# Patient Record
Sex: FEMALE | Race: OTHER | NOT HISPANIC OR LATINO | URBAN - METROPOLITAN AREA
[De-identification: names, ages, dates, MRNs, and addresses within clinical notes are randomized per-mention and may not be internally consistent; named-entity substitution may affect disease eponyms.]

---

## 2017-08-15 ENCOUNTER — OUTPATIENT (OUTPATIENT)
Dept: OUTPATIENT SERVICES | Facility: HOSPITAL | Age: 77
LOS: 1 days | Discharge: HOME | End: 2017-08-15

## 2017-08-15 DIAGNOSIS — D64.9 ANEMIA, UNSPECIFIED: ICD-10-CM

## 2017-08-15 DIAGNOSIS — M62.81 MUSCLE WEAKNESS (GENERALIZED): ICD-10-CM

## 2017-08-15 DIAGNOSIS — E11.9 TYPE 2 DIABETES MELLITUS WITHOUT COMPLICATIONS: ICD-10-CM

## 2017-08-15 DIAGNOSIS — I10 ESSENTIAL (PRIMARY) HYPERTENSION: ICD-10-CM

## 2017-08-15 DIAGNOSIS — I25.10 ATHEROSCLEROTIC HEART DISEASE OF NATIVE CORONARY ARTERY WITHOUT ANGINA PECTORIS: ICD-10-CM

## 2017-08-15 DIAGNOSIS — R53.1 WEAKNESS: ICD-10-CM

## 2017-08-15 DIAGNOSIS — E78.5 HYPERLIPIDEMIA, UNSPECIFIED: ICD-10-CM

## 2017-09-20 ENCOUNTER — OUTPATIENT (OUTPATIENT)
Dept: OUTPATIENT SERVICES | Facility: HOSPITAL | Age: 77
LOS: 1 days | Discharge: HOME | End: 2017-09-20

## 2017-09-20 DIAGNOSIS — E11.9 TYPE 2 DIABETES MELLITUS WITHOUT COMPLICATIONS: ICD-10-CM

## 2017-09-20 DIAGNOSIS — M62.81 MUSCLE WEAKNESS (GENERALIZED): ICD-10-CM

## 2017-09-20 DIAGNOSIS — I10 ESSENTIAL (PRIMARY) HYPERTENSION: ICD-10-CM

## 2017-09-20 DIAGNOSIS — R53.1 WEAKNESS: ICD-10-CM

## 2017-09-20 DIAGNOSIS — D64.9 ANEMIA, UNSPECIFIED: ICD-10-CM

## 2017-09-20 DIAGNOSIS — E78.5 HYPERLIPIDEMIA, UNSPECIFIED: ICD-10-CM

## 2017-09-22 ENCOUNTER — INPATIENT (INPATIENT)
Facility: HOSPITAL | Age: 77
LOS: 6 days | Discharge: NURSING, REHAB OR LONG-TERM CARE | End: 2017-09-29
Attending: INTERNAL MEDICINE | Admitting: INTERNAL MEDICINE

## 2017-09-22 DIAGNOSIS — M62.81 MUSCLE WEAKNESS (GENERALIZED): ICD-10-CM

## 2017-09-22 DIAGNOSIS — E11.9 TYPE 2 DIABETES MELLITUS WITHOUT COMPLICATIONS: ICD-10-CM

## 2017-09-22 DIAGNOSIS — D64.9 ANEMIA, UNSPECIFIED: ICD-10-CM

## 2017-09-22 DIAGNOSIS — R53.1 WEAKNESS: ICD-10-CM

## 2017-09-22 DIAGNOSIS — I10 ESSENTIAL (PRIMARY) HYPERTENSION: ICD-10-CM

## 2017-09-22 DIAGNOSIS — E78.5 HYPERLIPIDEMIA, UNSPECIFIED: ICD-10-CM

## 2017-10-02 DIAGNOSIS — E83.52 HYPERCALCEMIA: ICD-10-CM

## 2017-10-02 DIAGNOSIS — R05 COUGH: ICD-10-CM

## 2017-10-02 DIAGNOSIS — E11.9 TYPE 2 DIABETES MELLITUS WITHOUT COMPLICATIONS: ICD-10-CM

## 2017-10-02 DIAGNOSIS — R53.1 WEAKNESS: ICD-10-CM

## 2017-10-02 DIAGNOSIS — E87.5 HYPERKALEMIA: ICD-10-CM

## 2017-10-02 DIAGNOSIS — R26.81 UNSTEADINESS ON FEET: ICD-10-CM

## 2017-10-02 DIAGNOSIS — Z75.1 PERSON AWAITING ADMISSION TO ADEQUATE FACILITY ELSEWHERE: ICD-10-CM

## 2017-10-02 DIAGNOSIS — Z86.73 PERSONAL HISTORY OF TRANSIENT ISCHEMIC ATTACK (TIA), AND CEREBRAL INFARCTION WITHOUT RESIDUAL DEFICITS: ICD-10-CM

## 2017-10-02 DIAGNOSIS — I10 ESSENTIAL (PRIMARY) HYPERTENSION: ICD-10-CM

## 2017-10-02 DIAGNOSIS — D64.9 ANEMIA, UNSPECIFIED: ICD-10-CM

## 2017-10-02 DIAGNOSIS — E78.5 HYPERLIPIDEMIA, UNSPECIFIED: ICD-10-CM

## 2017-10-03 ENCOUNTER — OUTPATIENT (OUTPATIENT)
Dept: OUTPATIENT SERVICES | Facility: HOSPITAL | Age: 77
LOS: 1 days | Discharge: HOME | End: 2017-10-03

## 2017-10-03 DIAGNOSIS — E78.5 HYPERLIPIDEMIA, UNSPECIFIED: ICD-10-CM

## 2017-10-03 DIAGNOSIS — D64.9 ANEMIA, UNSPECIFIED: ICD-10-CM

## 2017-10-03 DIAGNOSIS — M62.81 MUSCLE WEAKNESS (GENERALIZED): ICD-10-CM

## 2017-10-03 DIAGNOSIS — R05 COUGH: ICD-10-CM

## 2017-10-03 DIAGNOSIS — I10 ESSENTIAL (PRIMARY) HYPERTENSION: ICD-10-CM

## 2017-10-03 DIAGNOSIS — E11.9 TYPE 2 DIABETES MELLITUS WITHOUT COMPLICATIONS: ICD-10-CM

## 2017-10-03 DIAGNOSIS — R53.1 WEAKNESS: ICD-10-CM

## 2017-10-04 ENCOUNTER — OUTPATIENT (OUTPATIENT)
Dept: OUTPATIENT SERVICES | Facility: HOSPITAL | Age: 77
LOS: 1 days | Discharge: HOME | End: 2017-10-04

## 2017-10-04 DIAGNOSIS — R79.9 ABNORMAL FINDING OF BLOOD CHEMISTRY, UNSPECIFIED: ICD-10-CM

## 2017-10-04 DIAGNOSIS — D64.9 ANEMIA, UNSPECIFIED: ICD-10-CM

## 2017-10-04 DIAGNOSIS — E78.5 HYPERLIPIDEMIA, UNSPECIFIED: ICD-10-CM

## 2017-10-04 DIAGNOSIS — K74.60 UNSPECIFIED CIRRHOSIS OF LIVER: ICD-10-CM

## 2017-10-04 DIAGNOSIS — R53.1 WEAKNESS: ICD-10-CM

## 2017-10-04 DIAGNOSIS — E11.9 TYPE 2 DIABETES MELLITUS WITHOUT COMPLICATIONS: ICD-10-CM

## 2017-10-04 DIAGNOSIS — M62.81 MUSCLE WEAKNESS (GENERALIZED): ICD-10-CM

## 2017-10-04 DIAGNOSIS — I10 ESSENTIAL (PRIMARY) HYPERTENSION: ICD-10-CM

## 2017-10-26 ENCOUNTER — OUTPATIENT (OUTPATIENT)
Dept: OUTPATIENT SERVICES | Facility: HOSPITAL | Age: 77
LOS: 1 days | Discharge: HOME | End: 2017-10-26

## 2017-10-26 DIAGNOSIS — I10 ESSENTIAL (PRIMARY) HYPERTENSION: ICD-10-CM

## 2017-10-26 DIAGNOSIS — E78.5 HYPERLIPIDEMIA, UNSPECIFIED: ICD-10-CM

## 2017-10-26 DIAGNOSIS — M62.81 MUSCLE WEAKNESS (GENERALIZED): ICD-10-CM

## 2017-10-26 DIAGNOSIS — R53.1 WEAKNESS: ICD-10-CM

## 2017-10-26 DIAGNOSIS — D64.9 ANEMIA, UNSPECIFIED: ICD-10-CM

## 2017-10-26 DIAGNOSIS — E11.9 TYPE 2 DIABETES MELLITUS WITHOUT COMPLICATIONS: ICD-10-CM

## 2018-02-13 ENCOUNTER — OUTPATIENT (OUTPATIENT)
Dept: OUTPATIENT SERVICES | Facility: HOSPITAL | Age: 78
LOS: 1 days | Discharge: HOME | End: 2018-02-13

## 2018-02-13 DIAGNOSIS — E11.9 TYPE 2 DIABETES MELLITUS WITHOUT COMPLICATIONS: ICD-10-CM

## 2018-02-13 DIAGNOSIS — D51.1 VITAMIN B12 DEFICIENCY ANEMIA DUE TO SELECTIVE VITAMIN B12 MALABSORPTION WITH PROTEINURIA: ICD-10-CM

## 2018-02-13 DIAGNOSIS — D50.9 IRON DEFICIENCY ANEMIA, UNSPECIFIED: ICD-10-CM

## 2018-02-13 DIAGNOSIS — I25.10 ATHEROSCLEROTIC HEART DISEASE OF NATIVE CORONARY ARTERY WITHOUT ANGINA PECTORIS: ICD-10-CM

## 2018-02-13 DIAGNOSIS — D51.8 OTHER VITAMIN B12 DEFICIENCY ANEMIAS: ICD-10-CM

## 2018-02-13 DIAGNOSIS — E03.9 HYPOTHYROIDISM, UNSPECIFIED: ICD-10-CM

## 2018-02-13 DIAGNOSIS — N39.0 URINARY TRACT INFECTION, SITE NOT SPECIFIED: ICD-10-CM

## 2018-04-20 ENCOUNTER — EMERGENCY (EMERGENCY)
Facility: HOSPITAL | Age: 78
LOS: 0 days | Discharge: HOME | End: 2018-04-20
Attending: EMERGENCY MEDICINE | Admitting: EMERGENCY MEDICINE

## 2018-04-20 VITALS
TEMPERATURE: 98 F | OXYGEN SATURATION: 97 % | DIASTOLIC BLOOD PRESSURE: 74 MMHG | RESPIRATION RATE: 16 BRPM | SYSTOLIC BLOOD PRESSURE: 133 MMHG | HEART RATE: 70 BPM

## 2018-04-20 VITALS
HEART RATE: 84 BPM | RESPIRATION RATE: 18 BRPM | TEMPERATURE: 98 F | DIASTOLIC BLOOD PRESSURE: 77 MMHG | OXYGEN SATURATION: 97 % | SYSTOLIC BLOOD PRESSURE: 172 MMHG

## 2018-04-20 DIAGNOSIS — R53.1 WEAKNESS: ICD-10-CM

## 2018-04-20 DIAGNOSIS — M54.2 CERVICALGIA: ICD-10-CM

## 2018-04-20 DIAGNOSIS — I10 ESSENTIAL (PRIMARY) HYPERTENSION: ICD-10-CM

## 2018-04-20 DIAGNOSIS — E11.9 TYPE 2 DIABETES MELLITUS WITHOUT COMPLICATIONS: ICD-10-CM

## 2018-04-20 LAB
ALBUMIN SERPL ELPH-MCNC: 4.2 G/DL — SIGNIFICANT CHANGE UP (ref 3.5–5.2)
ALP SERPL-CCNC: 89 U/L — SIGNIFICANT CHANGE UP (ref 30–115)
ALT FLD-CCNC: 8 U/L — SIGNIFICANT CHANGE UP (ref 0–41)
ANION GAP SERPL CALC-SCNC: 13 MMOL/L — SIGNIFICANT CHANGE UP (ref 7–14)
APTT BLD: 28.3 SEC — SIGNIFICANT CHANGE UP (ref 27–39.2)
AST SERPL-CCNC: 12 U/L — SIGNIFICANT CHANGE UP (ref 0–41)
BASE EXCESS BLDV CALC-SCNC: 4.2 MMOL/L — HIGH (ref -2–2)
BASOPHILS # BLD AUTO: 0.08 K/UL — SIGNIFICANT CHANGE UP (ref 0–0.2)
BASOPHILS NFR BLD AUTO: 1.3 % — HIGH (ref 0–1)
BILIRUB SERPL-MCNC: <0.2 MG/DL — SIGNIFICANT CHANGE UP (ref 0.2–1.2)
BUN SERPL-MCNC: 12 MG/DL — SIGNIFICANT CHANGE UP (ref 10–20)
CA-I SERPL-SCNC: 1.29 MMOL/L — SIGNIFICANT CHANGE UP (ref 1.12–1.3)
CALCIUM SERPL-MCNC: 10 MG/DL — SIGNIFICANT CHANGE UP (ref 8.5–10.1)
CHLORIDE SERPL-SCNC: 99 MMOL/L — SIGNIFICANT CHANGE UP (ref 98–110)
CK MB CFR SERPL CALC: 1.5 NG/ML — SIGNIFICANT CHANGE UP (ref 0.6–6.3)
CK SERPL-CCNC: 70 U/L — SIGNIFICANT CHANGE UP (ref 0–225)
CO2 SERPL-SCNC: 30 MMOL/L — SIGNIFICANT CHANGE UP (ref 17–32)
CREAT SERPL-MCNC: 1 MG/DL — SIGNIFICANT CHANGE UP (ref 0.7–1.5)
EOSINOPHIL # BLD AUTO: 0.4 K/UL — SIGNIFICANT CHANGE UP (ref 0–0.7)
EOSINOPHIL NFR BLD AUTO: 6.5 % — SIGNIFICANT CHANGE UP (ref 0–8)
GAS PNL BLDV: 142 MMOL/L — SIGNIFICANT CHANGE UP (ref 136–145)
GAS PNL BLDV: SIGNIFICANT CHANGE UP
GLUCOSE SERPL-MCNC: 137 MG/DL — HIGH (ref 70–99)
HCO3 BLDV-SCNC: 30 MMOL/L — HIGH (ref 22–29)
HCT VFR BLD CALC: 36.9 % — LOW (ref 37–47)
HCT VFR BLDA CALC: 35.6 % — SIGNIFICANT CHANGE UP (ref 34–44)
HGB BLD CALC-MCNC: 11.6 G/DL — LOW (ref 14–18)
HGB BLD-MCNC: 11.1 G/DL — LOW (ref 12–16)
IMM GRANULOCYTES NFR BLD AUTO: 0.3 % — SIGNIFICANT CHANGE UP (ref 0.1–0.3)
INR BLD: 1.04 RATIO — SIGNIFICANT CHANGE UP (ref 0.65–1.3)
LACTATE BLDV-MCNC: 2.8 MMOL/L — HIGH (ref 0.5–1.6)
LYMPHOCYTES # BLD AUTO: 2.26 K/UL — SIGNIFICANT CHANGE UP (ref 1.2–3.4)
LYMPHOCYTES # BLD AUTO: 36.8 % — SIGNIFICANT CHANGE UP (ref 20.5–51.1)
MCHC RBC-ENTMCNC: 21.9 PG — LOW (ref 27–31)
MCHC RBC-ENTMCNC: 30.1 G/DL — LOW (ref 32–37)
MCV RBC AUTO: 72.6 FL — LOW (ref 81–99)
MONOCYTES # BLD AUTO: 0.68 K/UL — HIGH (ref 0.1–0.6)
MONOCYTES NFR BLD AUTO: 11.1 % — HIGH (ref 1.7–9.3)
NEUTROPHILS # BLD AUTO: 2.7 K/UL — SIGNIFICANT CHANGE UP (ref 1.4–6.5)
NEUTROPHILS NFR BLD AUTO: 44 % — SIGNIFICANT CHANGE UP (ref 42.2–75.2)
NRBC # BLD: 0 /100 WBCS — SIGNIFICANT CHANGE UP (ref 0–0)
NT-PROBNP SERPL-SCNC: 196 PG/ML — SIGNIFICANT CHANGE UP (ref 0–300)
PCO2 BLDV: 46 MMHG — SIGNIFICANT CHANGE UP (ref 41–51)
PH BLDV: 7.41 — SIGNIFICANT CHANGE UP (ref 7.26–7.43)
PLATELET # BLD AUTO: 251 K/UL — SIGNIFICANT CHANGE UP (ref 130–400)
PO2 BLDV: 37 MMHG — SIGNIFICANT CHANGE UP (ref 20–40)
POTASSIUM BLDV-SCNC: 4.1 MMOL/L — SIGNIFICANT CHANGE UP (ref 3.3–5.6)
POTASSIUM SERPL-MCNC: 4.5 MMOL/L — SIGNIFICANT CHANGE UP (ref 3.5–5)
POTASSIUM SERPL-SCNC: 4.5 MMOL/L — SIGNIFICANT CHANGE UP (ref 3.5–5)
PROT SERPL-MCNC: 7.7 G/DL — SIGNIFICANT CHANGE UP (ref 6–8)
PROTHROM AB SERPL-ACNC: 11.2 SEC — SIGNIFICANT CHANGE UP (ref 9.95–12.87)
RBC # BLD: 5.08 M/UL — SIGNIFICANT CHANGE UP (ref 4.2–5.4)
RBC # FLD: 16.4 % — HIGH (ref 11.5–14.5)
SAO2 % BLDV: 70 % — SIGNIFICANT CHANGE UP
SODIUM SERPL-SCNC: 142 MMOL/L — SIGNIFICANT CHANGE UP (ref 135–146)
TROPONIN T SERPL-MCNC: <0.01 NG/ML — SIGNIFICANT CHANGE UP
WBC # BLD: 6.14 K/UL — SIGNIFICANT CHANGE UP (ref 4.8–10.8)
WBC # FLD AUTO: 6.14 K/UL — SIGNIFICANT CHANGE UP (ref 4.8–10.8)

## 2018-04-20 RX ORDER — ACETAMINOPHEN 500 MG
650 TABLET ORAL ONCE
Qty: 0 | Refills: 0 | Status: DISCONTINUED | OUTPATIENT
Start: 2018-04-20 | End: 2018-04-20

## 2018-04-20 NOTE — ED PROVIDER NOTE - NS ED ROS FT
ROS: No fever/chills, No headache/photophobia/eye pain/changes in vision, No ear pain/sore throat/dysphagia, No chest pain/palpitations, no SOB/cough/wheeze/stridor, No abdominal pain, No N/V/D/melena, no dysuria/frequency/discharge, no rash, no changes in neurological status/function.    +gneralized weakness, +r. neck pain

## 2018-04-20 NOTE — ED ADULT NURSE NOTE - PMH
Essential hypertension    High cholesterol    Type 2 diabetes mellitus without complication, without long-term current use of insulin

## 2018-04-20 NOTE — ED PROVIDER NOTE - OBJECTIVE STATEMENT
76y/o F w/hx of DM, HTN, and high cholesterol sent over by Dr. Molina who did an EKG and sent pt in for further evaluation. Pt went to Dr. ugarte for generalized weakness and r. shoulder, arm pain for  2 days. NO fever/chills recent travel hx, rash, headache/photophobia/eye pain/changes in vision, chest pain, SOB, vomiting or diarrhea

## 2018-04-20 NOTE — ED PROVIDER NOTE - PHYSICAL EXAMINATION
VITAL SIGNS: I have reviewed nursing notes and confirm.  CONSTITUTIONAL: Well-developed; well-nourished; in no acute distress. pt comfortable with family members.  SKIN: skin exam is warm and dry, no acute rash.   HEAD: Normocephalic; atraumatic.  EYES:  PERRLA EOM intact; conjunctiva and sclera clear.  ENT: No nasal discharge; airway clear. moist oral mucosa; uvula at midline. no pharyngeal erythema,   NECK: Supple; non tender.  CARD: S1, S2 normal; no murmurs, gallops, or rubs. Regular rate and rhythm. posterior tibial and radial pulses 2+  RESP: No wheezes, rales or rhonchi. cta b/l. no use of accessory muscles. no retractions  ABD: Normal bowel sounds; soft; non-distended; non-tender; no rebound.   EXT: Normal ROM. No  cyanosis or edema.  BACK: No cva tenderness  LYMPH: No acute cervical adenopathy.  NEURO: Alert, oriented, grossly unremarkable.  CN 2-12 intact. normal gait. normal romberg's.  sensory grossly intact to face, upper and lower extremity.  5/5 strength to , extension and flexion at elbow, flexion at hip  PSYCH: Cooperative, appropriate.

## 2018-04-20 NOTE — ED PROVIDER NOTE - PROGRESS NOTE DETAILS
ATTENDING NOTE: 76 y/o female with an pmhx of DM, HTN, and high cholesterol, presents to the ED with family member who states pt was sent over by Dr. Molina who did an EKG and sent pt in for further evaluation. Pt c/o right shoulder, right arm pain, and generalized weakness x 2 days. No fever/chills, No headache/photophobia/eye pain/changes in vision, No ear pain/sore throat/dysphagia, No chest pain/palpitations, no SOB/cough/wheeze/stridor, No abdominal pain, No N/V/D/melena, no dysuria/frequency/discharge, No neck/back pain, no rash, no changes in neurological status/function. pt in NAD, AAOx3, head NC/AT, CN II-XII intact, lungs CTA B/L, CV S1S2 regular, abdomen soft/NT/ND/(+)BS, ext (-) edema. motor 5/5x4, sensation intact. Plan: Labs, EKG, CXR, and reassess. Discussed with Dr. Molina about patients results-- PCP okay with patient being discharged.  patient has also NOT complained to him about CP or SOB, EF Discussed with Dr. Molina about patients results-- PCP okay with patient being discharged.  patient has also NOT complained to him about CP or SOB, EF 69 as per Dr. Preston, but was concern that there were crackles to lungs.  Discussed results of ekg, chest xray and blood.  Dr. Preston wants to f/u with him. ATTENDING NOTE: 76 y/o female with an pmhx of DM, HTN, and high cholesterol, presents to the ED with family member who states pt was sent over by Dr. Molina who did an EKG and sent pt in for further evaluation. Pt c/o right shoulder, right arm pain, and generalized weakness x 2 days. No fever/chills, No headache/photophobia/eye pain/changes in vision, No ear pain/sore throat/dysphagia, No chest pain/palpitations, no SOB/cough/wheeze/stridor, No abdominal pain, No N/V/D/melena, no dysuria/frequency/discharge, No neck/back pain, no rash, no changes in neurological status/function. pt in NAD, AAOx3, head NC/AT, CN II-XII intact, (+) TTP over right trapezius associated with spasm, lungs CTA B/L, CV S1S2 regular, abdomen soft/NT/ND/(+)BS, ext (-) edema. Plan: Labs, EKG, CXR, and reassess. Discussed with Dr. Molina about patients results-- PCP okay with patient being discharged.  patient has also NOT complained to him about CP or SOB, EF 69 as per Dr. Preston, but was concern that there were crackles to lungs.  Discussed results of ekg, chest xray and blood.  Dr. Preston wants to f/u with him in the office.

## 2018-06-19 ENCOUNTER — OUTPATIENT (OUTPATIENT)
Dept: OUTPATIENT SERVICES | Facility: HOSPITAL | Age: 78
LOS: 1 days | Discharge: HOME | End: 2018-06-19

## 2018-06-19 DIAGNOSIS — D51.1 VITAMIN B12 DEFICIENCY ANEMIA DUE TO SELECTIVE VITAMIN B12 MALABSORPTION WITH PROTEINURIA: ICD-10-CM

## 2018-06-19 DIAGNOSIS — N39.0 URINARY TRACT INFECTION, SITE NOT SPECIFIED: ICD-10-CM

## 2018-06-19 DIAGNOSIS — I25.10 ATHEROSCLEROTIC HEART DISEASE OF NATIVE CORONARY ARTERY WITHOUT ANGINA PECTORIS: ICD-10-CM

## 2018-06-19 DIAGNOSIS — E55.9 VITAMIN D DEFICIENCY, UNSPECIFIED: ICD-10-CM

## 2018-06-19 DIAGNOSIS — E11.9 TYPE 2 DIABETES MELLITUS WITHOUT COMPLICATIONS: ICD-10-CM

## 2018-06-19 DIAGNOSIS — D50.9 IRON DEFICIENCY ANEMIA, UNSPECIFIED: ICD-10-CM

## 2018-06-19 DIAGNOSIS — D51.8 OTHER VITAMIN B12 DEFICIENCY ANEMIAS: ICD-10-CM

## 2018-06-19 DIAGNOSIS — E83.40 DISORDERS OF MAGNESIUM METABOLISM, UNSPECIFIED: ICD-10-CM

## 2018-09-17 ENCOUNTER — OUTPATIENT (OUTPATIENT)
Dept: OUTPATIENT SERVICES | Facility: HOSPITAL | Age: 78
LOS: 1 days | Discharge: HOME | End: 2018-09-17

## 2018-09-17 DIAGNOSIS — E11.9 TYPE 2 DIABETES MELLITUS WITHOUT COMPLICATIONS: ICD-10-CM

## 2018-09-17 DIAGNOSIS — D51.8 OTHER VITAMIN B12 DEFICIENCY ANEMIAS: ICD-10-CM

## 2018-09-17 DIAGNOSIS — D51.1 VITAMIN B12 DEFICIENCY ANEMIA DUE TO SELECTIVE VITAMIN B12 MALABSORPTION WITH PROTEINURIA: ICD-10-CM

## 2018-09-17 DIAGNOSIS — E55.9 VITAMIN D DEFICIENCY, UNSPECIFIED: ICD-10-CM

## 2018-09-17 DIAGNOSIS — N39.0 URINARY TRACT INFECTION, SITE NOT SPECIFIED: ICD-10-CM

## 2018-09-17 DIAGNOSIS — D50.9 IRON DEFICIENCY ANEMIA, UNSPECIFIED: ICD-10-CM

## 2018-09-17 DIAGNOSIS — I25.10 ATHEROSCLEROTIC HEART DISEASE OF NATIVE CORONARY ARTERY WITHOUT ANGINA PECTORIS: ICD-10-CM

## 2018-09-18 PROBLEM — E78.00 PURE HYPERCHOLESTEROLEMIA, UNSPECIFIED: Chronic | Status: ACTIVE | Noted: 2018-04-20

## 2018-09-18 PROBLEM — E11.9 TYPE 2 DIABETES MELLITUS WITHOUT COMPLICATIONS: Chronic | Status: ACTIVE | Noted: 2018-04-20

## 2018-09-18 PROBLEM — I10 ESSENTIAL (PRIMARY) HYPERTENSION: Chronic | Status: ACTIVE | Noted: 2018-04-20

## 2018-10-03 ENCOUNTER — EMERGENCY (EMERGENCY)
Facility: HOSPITAL | Age: 78
LOS: 0 days | Discharge: HOME | End: 2018-10-04
Attending: EMERGENCY MEDICINE | Admitting: EMERGENCY MEDICINE

## 2018-10-03 VITALS
RESPIRATION RATE: 20 BRPM | SYSTOLIC BLOOD PRESSURE: 144 MMHG | DIASTOLIC BLOOD PRESSURE: 76 MMHG | HEART RATE: 78 BPM | OXYGEN SATURATION: 100 % | TEMPERATURE: 98 F

## 2018-10-03 DIAGNOSIS — E78.5 HYPERLIPIDEMIA, UNSPECIFIED: ICD-10-CM

## 2018-10-03 DIAGNOSIS — Z79.899 OTHER LONG TERM (CURRENT) DRUG THERAPY: ICD-10-CM

## 2018-10-03 DIAGNOSIS — R42 DIZZINESS AND GIDDINESS: ICD-10-CM

## 2018-10-03 DIAGNOSIS — E11.9 TYPE 2 DIABETES MELLITUS WITHOUT COMPLICATIONS: ICD-10-CM

## 2018-10-03 DIAGNOSIS — Z79.82 LONG TERM (CURRENT) USE OF ASPIRIN: ICD-10-CM

## 2018-10-03 DIAGNOSIS — R25.1 TREMOR, UNSPECIFIED: ICD-10-CM

## 2018-10-03 DIAGNOSIS — I10 ESSENTIAL (PRIMARY) HYPERTENSION: ICD-10-CM

## 2018-10-03 DIAGNOSIS — R19.7 DIARRHEA, UNSPECIFIED: ICD-10-CM

## 2018-10-03 DIAGNOSIS — E78.00 PURE HYPERCHOLESTEROLEMIA, UNSPECIFIED: ICD-10-CM

## 2018-10-03 DIAGNOSIS — R11.2 NAUSEA WITH VOMITING, UNSPECIFIED: ICD-10-CM

## 2018-10-03 DIAGNOSIS — Z79.84 LONG TERM (CURRENT) USE OF ORAL HYPOGLYCEMIC DRUGS: ICD-10-CM

## 2018-10-03 DIAGNOSIS — F03.90 UNSPECIFIED DEMENTIA WITHOUT BEHAVIORAL DISTURBANCE: ICD-10-CM

## 2018-10-03 LAB
ALBUMIN SERPL ELPH-MCNC: 4.6 G/DL — SIGNIFICANT CHANGE UP (ref 3.5–5.2)
ALP SERPL-CCNC: 107 U/L — SIGNIFICANT CHANGE UP (ref 30–115)
ALT FLD-CCNC: 11 U/L — SIGNIFICANT CHANGE UP (ref 0–41)
ANION GAP SERPL CALC-SCNC: 14 MMOL/L — SIGNIFICANT CHANGE UP (ref 7–14)
APPEARANCE UR: CLEAR — SIGNIFICANT CHANGE UP
APTT BLD: 29.6 SEC — SIGNIFICANT CHANGE UP (ref 27–39.2)
AST SERPL-CCNC: 14 U/L — SIGNIFICANT CHANGE UP (ref 0–41)
BACTERIA # UR AUTO: NEGATIVE — SIGNIFICANT CHANGE UP
BASOPHILS # BLD AUTO: 0.05 K/UL — SIGNIFICANT CHANGE UP (ref 0–0.2)
BASOPHILS NFR BLD AUTO: 0.8 % — SIGNIFICANT CHANGE UP (ref 0–1)
BILIRUB SERPL-MCNC: <0.2 MG/DL — SIGNIFICANT CHANGE UP (ref 0.2–1.2)
BILIRUB UR-MCNC: NEGATIVE — SIGNIFICANT CHANGE UP
BUN SERPL-MCNC: 10 MG/DL — SIGNIFICANT CHANGE UP (ref 10–20)
CALCIUM SERPL-MCNC: 11.2 MG/DL — HIGH (ref 8.5–10.1)
CHLORIDE SERPL-SCNC: 97 MMOL/L — LOW (ref 98–110)
CO2 SERPL-SCNC: 27 MMOL/L — SIGNIFICANT CHANGE UP (ref 17–32)
COLOR SPEC: YELLOW — SIGNIFICANT CHANGE UP
CREAT SERPL-MCNC: 0.8 MG/DL — SIGNIFICANT CHANGE UP (ref 0.7–1.5)
DIFF PNL FLD: ABNORMAL
EOSINOPHIL # BLD AUTO: 0.06 K/UL — SIGNIFICANT CHANGE UP (ref 0–0.7)
EOSINOPHIL NFR BLD AUTO: 0.9 % — SIGNIFICANT CHANGE UP (ref 0–8)
GLUCOSE SERPL-MCNC: 246 MG/DL — HIGH (ref 70–99)
GLUCOSE UR QL: 100 MG/DL
HCT VFR BLD CALC: 37.7 % — SIGNIFICANT CHANGE UP (ref 37–47)
HGB BLD-MCNC: 11.3 G/DL — LOW (ref 12–16)
IMM GRANULOCYTES NFR BLD AUTO: 0.5 % — HIGH (ref 0.1–0.3)
INR BLD: 1.02 RATIO — SIGNIFICANT CHANGE UP (ref 0.65–1.3)
KETONES UR-MCNC: NEGATIVE — SIGNIFICANT CHANGE UP
LEUKOCYTE ESTERASE UR-ACNC: NEGATIVE — SIGNIFICANT CHANGE UP
LIDOCAIN IGE QN: 15 U/L — SIGNIFICANT CHANGE UP (ref 7–60)
LYMPHOCYTES # BLD AUTO: 2.5 K/UL — SIGNIFICANT CHANGE UP (ref 1.2–3.4)
LYMPHOCYTES # BLD AUTO: 38.8 % — SIGNIFICANT CHANGE UP (ref 20.5–51.1)
MCHC RBC-ENTMCNC: 21.3 PG — LOW (ref 27–31)
MCHC RBC-ENTMCNC: 30 G/DL — LOW (ref 32–37)
MCV RBC AUTO: 71 FL — LOW (ref 81–99)
MONOCYTES # BLD AUTO: 0.61 K/UL — HIGH (ref 0.1–0.6)
MONOCYTES NFR BLD AUTO: 9.5 % — HIGH (ref 1.7–9.3)
NEUTROPHILS # BLD AUTO: 3.19 K/UL — SIGNIFICANT CHANGE UP (ref 1.4–6.5)
NEUTROPHILS NFR BLD AUTO: 49.5 % — SIGNIFICANT CHANGE UP (ref 42.2–75.2)
NITRITE UR-MCNC: NEGATIVE — SIGNIFICANT CHANGE UP
NRBC # BLD: 0 /100 WBCS — SIGNIFICANT CHANGE UP (ref 0–0)
PH UR: 6 — SIGNIFICANT CHANGE UP (ref 5–8)
PLATELET # BLD AUTO: 269 K/UL — SIGNIFICANT CHANGE UP (ref 130–400)
POTASSIUM SERPL-MCNC: 4.3 MMOL/L — SIGNIFICANT CHANGE UP (ref 3.5–5)
POTASSIUM SERPL-SCNC: 4.3 MMOL/L — SIGNIFICANT CHANGE UP (ref 3.5–5)
PROT SERPL-MCNC: 8.4 G/DL — HIGH (ref 6–8)
PROT UR-MCNC: NEGATIVE — SIGNIFICANT CHANGE UP
PROTHROM AB SERPL-ACNC: 11 SEC — SIGNIFICANT CHANGE UP (ref 9.95–12.87)
RBC # BLD: 5.31 M/UL — SIGNIFICANT CHANGE UP (ref 4.2–5.4)
RBC # FLD: 15.8 % — HIGH (ref 11.5–14.5)
RBC CASTS # UR COMP ASSIST: SIGNIFICANT CHANGE UP /HPF
SODIUM SERPL-SCNC: 138 MMOL/L — SIGNIFICANT CHANGE UP (ref 135–146)
SP GR SPEC: 1.01 — SIGNIFICANT CHANGE UP (ref 1.01–1.03)
TROPONIN T SERPL-MCNC: <0.01 NG/ML — SIGNIFICANT CHANGE UP
UROBILINOGEN FLD QL: 0.2 — SIGNIFICANT CHANGE UP (ref 0.2–0.2)
WBC # BLD: 6.44 K/UL — SIGNIFICANT CHANGE UP (ref 4.8–10.8)
WBC # FLD AUTO: 6.44 K/UL — SIGNIFICANT CHANGE UP (ref 4.8–10.8)
WBC UR QL: SIGNIFICANT CHANGE UP /HPF

## 2018-10-03 RX ORDER — ONDANSETRON 8 MG/1
4 TABLET, FILM COATED ORAL ONCE
Qty: 0 | Refills: 0 | Status: COMPLETED | OUTPATIENT
Start: 2018-10-03 | End: 2018-10-03

## 2018-10-03 RX ORDER — SODIUM CHLORIDE 9 MG/ML
1000 INJECTION INTRAMUSCULAR; INTRAVENOUS; SUBCUTANEOUS ONCE
Qty: 0 | Refills: 0 | Status: COMPLETED | OUTPATIENT
Start: 2018-10-03 | End: 2018-10-03

## 2018-10-03 RX ORDER — FAMOTIDINE 10 MG/ML
20 INJECTION INTRAVENOUS ONCE
Qty: 0 | Refills: 0 | Status: COMPLETED | OUTPATIENT
Start: 2018-10-03 | End: 2018-10-03

## 2018-10-03 RX ADMIN — SODIUM CHLORIDE 1000 MILLILITER(S): 9 INJECTION INTRAMUSCULAR; INTRAVENOUS; SUBCUTANEOUS at 21:08

## 2018-10-03 RX ADMIN — ONDANSETRON 4 MILLIGRAM(S): 8 TABLET, FILM COATED ORAL at 21:08

## 2018-10-03 RX ADMIN — FAMOTIDINE 20 MILLIGRAM(S): 10 INJECTION INTRAVENOUS at 21:08

## 2018-10-03 RX ADMIN — SODIUM CHLORIDE 1000 MILLILITER(S): 9 INJECTION INTRAMUSCULAR; INTRAVENOUS; SUBCUTANEOUS at 22:08

## 2018-10-03 NOTE — ED PROVIDER NOTE - NS ED ROS FT
Constitutional: no fever, chills, no recent weight loss, change in appetite or malaise  Eyes: no redness/discharge/pain/vision changes  ENT: no rhinorrhea/ear pain/sore throat  Cardiac: No chest pain, SOB or edema.  Respiratory: No cough or respiratory distress  GI: + nausea, No vomiting, diarrhea or abdominal pain.  : No dysuria, frequency, urgency or hematuria  MS: no pain to back or extremities, no loss of ROM, no weakness  Neuro: No headache or weakness. No LOC.  Skin: No skin rash.  Endocrine: + diabetes.  Except as documented in the HPI, all other systems are negative.

## 2018-10-03 NOTE — ED PROVIDER NOTE - PMH
Essential hypertension    High cholesterol    Type 2 diabetes mellitus without complication, without long-term current use of insulin Dementia    Essential hypertension    High cholesterol    Type 2 diabetes mellitus without complication, without long-term current use of insulin

## 2018-10-03 NOTE — ED PROVIDER NOTE - ATTENDING CONTRIBUTION TO CARE
pt here for shakes/chills per aid.  pt is poor historian. per aid, had chills/shakes. no loc. no n, v,d. pt at her baseline ms. pt in nad, anicteric, neck sup, ctab, rrr, ab soft, nt, nd. no focal def. will get labs, urine, cxr.

## 2018-10-03 NOTE — ED PROVIDER NOTE - OBJECTIVE STATEMENT
78 yo female hx of HTN/HLD/DM/Dementia brought in by HHA 2/2 nausea and dizziness. denies vomiting and diarrhea. HHA noticed tremors so she called ambulance. patient denies any sxs in ED now. Denies HA/slur speech/extremities weakness. Denies Fever/chill/recent illness/coughing/chest pain/sob/abd pain/n/v/d/extremities pain/urinary sxs. Denies SI/HI/Hallucinations

## 2018-10-03 NOTE — ED ADULT NURSE NOTE - OBJECTIVE STATEMENT
78 y/o female presents to the ED c/o n/v/d & weakness x today. Denies hematemesis, CP, urinary symptoms, BRBPR, or syncope

## 2018-10-03 NOTE — ED PROVIDER NOTE - PHYSICAL EXAMINATION
CONSTITUTIONAL: Well-appearing; well-nourished; in no apparent distress.   EYES: PERRL; EOM intact.   ENT: normal nose; no rhinorrhea; normal pharynx with no tonsillar hypertrophy.   CARDIOVASCULAR: Normal S1, S2; no murmurs, rubs, or gallops.   RESPIRATORY: Normal chest excursion with respiration; breath sounds clear and equal bilaterally; no wheezes, rhonchi, or rales.  GI/: Normal bowel sounds; non-distended; non-tender; no palpable organomegaly.   MS: No evidence of trauma or deformity. Non-tender to palpation. Normal ROM in all four extremities; non-tender to palpation; distal pulses are normal.   SKIN: Normal for age and race; warm; dry; good turgor; no apparent lesions or exudate.   NEURO/PSYCH: A & O x 4; grossly unremarkable. mood and manner are appropriate.

## 2018-10-07 LAB
-  AMIKACIN: SIGNIFICANT CHANGE UP
-  AZTREONAM: SIGNIFICANT CHANGE UP
-  CEFEPIME: SIGNIFICANT CHANGE UP
-  CIPROFLOXACIN: SIGNIFICANT CHANGE UP
-  GENTAMICIN: SIGNIFICANT CHANGE UP
-  LEVOFLOXACIN: SIGNIFICANT CHANGE UP
-  MEROPENEM: SIGNIFICANT CHANGE UP
-  PIPERACILLIN/TAZOBACTAM: SIGNIFICANT CHANGE UP
-  TOBRAMYCIN: SIGNIFICANT CHANGE UP
CULTURE RESULTS: SIGNIFICANT CHANGE UP
METHOD TYPE: SIGNIFICANT CHANGE UP
ORGANISM # SPEC MICROSCOPIC CNT: SIGNIFICANT CHANGE UP
ORGANISM # SPEC MICROSCOPIC CNT: SIGNIFICANT CHANGE UP
SPECIMEN SOURCE: SIGNIFICANT CHANGE UP

## 2018-10-11 RX ORDER — MOXIFLOXACIN HYDROCHLORIDE TABLETS, 400 MG 400 MG/1
1 TABLET, FILM COATED ORAL
Qty: 14 | Refills: 0
Start: 2018-10-11 | End: 2018-10-17

## 2019-03-08 ENCOUNTER — EMERGENCY (EMERGENCY)
Facility: HOSPITAL | Age: 79
LOS: 0 days | Discharge: HOME | End: 2019-03-08
Admitting: PHYSICIAN ASSISTANT

## 2019-03-08 VITALS
RESPIRATION RATE: 19 BRPM | DIASTOLIC BLOOD PRESSURE: 78 MMHG | SYSTOLIC BLOOD PRESSURE: 168 MMHG | TEMPERATURE: 97 F | HEART RATE: 73 BPM | OXYGEN SATURATION: 100 %

## 2019-03-08 DIAGNOSIS — Z79.899 OTHER LONG TERM (CURRENT) DRUG THERAPY: ICD-10-CM

## 2019-03-08 DIAGNOSIS — Z79.2 LONG TERM (CURRENT) USE OF ANTIBIOTICS: ICD-10-CM

## 2019-03-08 DIAGNOSIS — K08.89 OTHER SPECIFIED DISORDERS OF TEETH AND SUPPORTING STRUCTURES: ICD-10-CM

## 2019-03-08 DIAGNOSIS — Z79.84 LONG TERM (CURRENT) USE OF ORAL HYPOGLYCEMIC DRUGS: ICD-10-CM

## 2019-03-08 DIAGNOSIS — Z79.82 LONG TERM (CURRENT) USE OF ASPIRIN: ICD-10-CM

## 2019-03-08 DIAGNOSIS — F03.90 UNSPECIFIED DEMENTIA WITHOUT BEHAVIORAL DISTURBANCE: ICD-10-CM

## 2019-03-08 DIAGNOSIS — K02.9 DENTAL CARIES, UNSPECIFIED: ICD-10-CM

## 2019-03-08 NOTE — ED PROVIDER NOTE - OBJECTIVE STATEMENT
dental pain x 2 weeks. Constant ache mod in severity. No relieving factors. Worse with chewing. No fever

## 2019-03-08 NOTE — PROGRESS NOTE ADULT - SUBJECTIVE AND OBJECTIVE BOX
Patient is a 78y old  Female who presents with a chief complaint of pain in the anterior region of the mandible.    PAST MEDICAL & SURGICAL HISTORY:  Dementia  Essential hypertension  Type 2 diabetes mellitus without complication, without long-term current use of insulin  High cholesterol  No significant past surgical history    Allergic/Immunologic:	    Allergies    No Known Allergies    Intolerances        FAMILY HISTORY:  No pertinent family history in first degree relatives      Vital Signs Last 24 Hrs  T(C): 36.1 (08 Mar 2019 13:49), Max: 36.1 (08 Mar 2019 13:49)  T(F): 96.9 (08 Mar 2019 13:49), Max: 96.9 (08 Mar 2019 13:49)  HR: 73 (08 Mar 2019 13:49) (73 - 73)  BP: 168/78 (08 Mar 2019 13:49) (168/78 - 168/78)  BP(mean): --  RR: 19 (08 Mar 2019 13:49) (19 - 19)  SpO2: 100% (08 Mar 2019 13:49) (100% - 100%)    IOE:  <<permanent>> dentition:                      <<multiple carious teeth>>               hard/soft palate:  <<No pathology noted>>            tongue/FOM <<No pathology noted>>            labial/buccal mucosa <<No pathology noted>>           ( Y) percussion           ( Y  ) palpation           ( N  ) swelling            ( N  ) abscess           ( N  ) sinus tract    *DENTAL RADIOGRAPHS: periapical 25/26    *ASSESSMENT: Patient is a 78y old  Female who presents with a chief complaint of pain in the anterior region of the mandible. Patient is afebrile and does not present with any swelling or trismus. Patient has grossly decayed teeth.    *PLAN: Extract #25/26    PROCEDURE:   Verbal and written consent given.  Anesthesia: <<1 carpule Septocaine HCl 4% 1:100,000 epinephrine as buccal/lingual infiltration.    >>   Treatment: <<Risks/benefits discussed as per OS sheet 7/13/00. Side/site/consent signed. 1 carpule Septocaine HCl 4% 1:100,000 epinephrine as buccal/lingual infiltration.  Simple extraction 25/26, post-op x-ray/instructions, hemostasis achieved.  >>     RECOMMENDATIONS:  1) <<amox 500 and ibuprofen 600 prescribed   >>  2) Dental F/U with outpatient dentist for comprehensive dental care.   3) If any difficulty swallowing/breathing, fever occur, return to ER.     Kaleb Prokopenko DDS

## 2019-03-08 NOTE — ED PROVIDER NOTE - CLINICAL SUMMARY MEDICAL DECISION MAKING FREE TEXT BOX
Pt with dementia c/o dental pain x 2 weeks. Exam with widespread decay and gingival disease but no abscess. No report of CP, fever. Will transfer to dental

## 2019-03-08 NOTE — ED PROVIDER NOTE - PHYSICAL EXAMINATION
CONST: Well appearing in NAD  EYES, EOMI, Sclera and conjunctiva clear.   ENT: No nasal discharge. TM's clear B/L without drainage. Oropharynx normal appearing, no erythema or exudates. Uvula midline. widespread dental decay and gingival disease. No abscess formation.   NECK: Non-tender, no meningeal signs  CARD: Normal S1 S2; Normal rate and rhythm  RESP: Equal BS B/L, No wheezes, rhonchi or rales. No distress  GI: Soft, non-tender, non-distended.  MS: Normal ROM in all extremities. No midline spinal tenderness.  SKIN: Warm, dry, no acute rashes. Good turgor  NEURO: A&Ox2, (dementia baseline) No focal deficits. Strength 5/5 with no sensory deficits. Steady gait

## 2019-04-05 ENCOUNTER — OUTPATIENT (OUTPATIENT)
Dept: OUTPATIENT SERVICES | Facility: HOSPITAL | Age: 79
LOS: 1 days | Discharge: HOME | End: 2019-04-05

## 2019-04-05 DIAGNOSIS — E78.5 HYPERLIPIDEMIA, UNSPECIFIED: ICD-10-CM

## 2019-04-05 DIAGNOSIS — Z00.00 ENCOUNTER FOR GENERAL ADULT MEDICAL EXAMINATION WITHOUT ABNORMAL FINDINGS: ICD-10-CM

## 2019-04-05 DIAGNOSIS — E11.9 TYPE 2 DIABETES MELLITUS WITHOUT COMPLICATIONS: ICD-10-CM

## 2019-04-05 DIAGNOSIS — D51.0 VITAMIN B12 DEFICIENCY ANEMIA DUE TO INTRINSIC FACTOR DEFICIENCY: ICD-10-CM

## 2019-04-05 DIAGNOSIS — E03.9 HYPOTHYROIDISM, UNSPECIFIED: ICD-10-CM

## 2019-04-05 DIAGNOSIS — D53.9 NUTRITIONAL ANEMIA, UNSPECIFIED: ICD-10-CM

## 2019-04-05 DIAGNOSIS — E55.9 VITAMIN D DEFICIENCY, UNSPECIFIED: ICD-10-CM

## 2019-04-05 DIAGNOSIS — N39.0 URINARY TRACT INFECTION, SITE NOT SPECIFIED: ICD-10-CM

## 2019-04-05 DIAGNOSIS — D51.8 OTHER VITAMIN B12 DEFICIENCY ANEMIAS: ICD-10-CM

## 2019-06-17 ENCOUNTER — INPATIENT (INPATIENT)
Facility: HOSPITAL | Age: 79
LOS: 0 days | Discharge: HOME HEALTH | End: 2019-06-18
Attending: HOSPITALIST | Admitting: HOSPITALIST
Payer: MEDICAID

## 2019-06-17 VITALS
SYSTOLIC BLOOD PRESSURE: 145 MMHG | RESPIRATION RATE: 18 BRPM | DIASTOLIC BLOOD PRESSURE: 76 MMHG | HEART RATE: 88 BPM | OXYGEN SATURATION: 100 % | TEMPERATURE: 99 F

## 2019-06-17 LAB
ALBUMIN SERPL ELPH-MCNC: 4.3 G/DL — SIGNIFICANT CHANGE UP (ref 3.5–5.2)
ALP SERPL-CCNC: 63 U/L — SIGNIFICANT CHANGE UP (ref 30–115)
ALT FLD-CCNC: 11 U/L — SIGNIFICANT CHANGE UP (ref 0–41)
ANION GAP SERPL CALC-SCNC: 15 MMOL/L — HIGH (ref 7–14)
APPEARANCE UR: CLEAR — SIGNIFICANT CHANGE UP
AST SERPL-CCNC: 14 U/L — SIGNIFICANT CHANGE UP (ref 0–41)
BASOPHILS # BLD AUTO: 0.06 K/UL — SIGNIFICANT CHANGE UP (ref 0–0.2)
BASOPHILS NFR BLD AUTO: 1 % — SIGNIFICANT CHANGE UP (ref 0–1)
BILIRUB SERPL-MCNC: 0.2 MG/DL — SIGNIFICANT CHANGE UP (ref 0.2–1.2)
BILIRUB UR-MCNC: NEGATIVE — SIGNIFICANT CHANGE UP
BUN SERPL-MCNC: 10 MG/DL — SIGNIFICANT CHANGE UP (ref 10–20)
CALCIUM SERPL-MCNC: 10 MG/DL — SIGNIFICANT CHANGE UP (ref 8.5–10.1)
CHLORIDE SERPL-SCNC: 84 MMOL/L — LOW (ref 98–110)
CK SERPL-CCNC: 79 U/L — SIGNIFICANT CHANGE UP (ref 0–225)
CO2 SERPL-SCNC: 26 MMOL/L — SIGNIFICANT CHANGE UP (ref 17–32)
COLOR SPEC: YELLOW — SIGNIFICANT CHANGE UP
CREAT SERPL-MCNC: 0.9 MG/DL — SIGNIFICANT CHANGE UP (ref 0.7–1.5)
DIFF PNL FLD: ABNORMAL
EOSINOPHIL # BLD AUTO: 0.05 K/UL — SIGNIFICANT CHANGE UP (ref 0–0.7)
EOSINOPHIL NFR BLD AUTO: 0.9 % — SIGNIFICANT CHANGE UP (ref 0–8)
EPI CELLS # UR: ABNORMAL /HPF
GLUCOSE BLDC GLUCOMTR-MCNC: 234 MG/DL — HIGH (ref 70–99)
GLUCOSE SERPL-MCNC: 218 MG/DL — HIGH (ref 70–99)
GLUCOSE UR QL: NEGATIVE MG/DL — SIGNIFICANT CHANGE UP
HCT VFR BLD CALC: 36.2 % — LOW (ref 37–47)
HGB BLD-MCNC: 11.5 G/DL — LOW (ref 12–16)
IMM GRANULOCYTES NFR BLD AUTO: 0.3 % — SIGNIFICANT CHANGE UP (ref 0.1–0.3)
KETONES UR-MCNC: NEGATIVE — SIGNIFICANT CHANGE UP
LEUKOCYTE ESTERASE UR-ACNC: NEGATIVE — SIGNIFICANT CHANGE UP
LYMPHOCYTES # BLD AUTO: 1.53 K/UL — SIGNIFICANT CHANGE UP (ref 1.2–3.4)
LYMPHOCYTES # BLD AUTO: 26.2 % — SIGNIFICANT CHANGE UP (ref 20.5–51.1)
MCHC RBC-ENTMCNC: 22.3 PG — LOW (ref 27–31)
MCHC RBC-ENTMCNC: 31.8 G/DL — LOW (ref 32–37)
MCV RBC AUTO: 70.2 FL — LOW (ref 81–99)
MONOCYTES # BLD AUTO: 0.61 K/UL — HIGH (ref 0.1–0.6)
MONOCYTES NFR BLD AUTO: 10.4 % — HIGH (ref 1.7–9.3)
NEUTROPHILS # BLD AUTO: 3.57 K/UL — SIGNIFICANT CHANGE UP (ref 1.4–6.5)
NEUTROPHILS NFR BLD AUTO: 61.2 % — SIGNIFICANT CHANGE UP (ref 42.2–75.2)
NITRITE UR-MCNC: NEGATIVE — SIGNIFICANT CHANGE UP
NRBC # BLD: 0 /100 WBCS — SIGNIFICANT CHANGE UP (ref 0–0)
PH UR: 7 — SIGNIFICANT CHANGE UP (ref 5–8)
PLATELET # BLD AUTO: 256 K/UL — SIGNIFICANT CHANGE UP (ref 130–400)
POTASSIUM SERPL-MCNC: 3.8 MMOL/L — SIGNIFICANT CHANGE UP (ref 3.5–5)
POTASSIUM SERPL-SCNC: 3.8 MMOL/L — SIGNIFICANT CHANGE UP (ref 3.5–5)
PROT SERPL-MCNC: 7.7 G/DL — SIGNIFICANT CHANGE UP (ref 6–8)
PROT UR-MCNC: NEGATIVE MG/DL — SIGNIFICANT CHANGE UP
RBC # BLD: 5.16 M/UL — SIGNIFICANT CHANGE UP (ref 4.2–5.4)
RBC # FLD: 15.7 % — HIGH (ref 11.5–14.5)
RBC CASTS # UR COMP ASSIST: SIGNIFICANT CHANGE UP /HPF
SODIUM SERPL-SCNC: 125 MMOL/L — LOW (ref 135–146)
SP GR SPEC: <=1.005 — SIGNIFICANT CHANGE UP (ref 1.01–1.03)
TROPONIN T SERPL-MCNC: <0.01 NG/ML — SIGNIFICANT CHANGE UP
UROBILINOGEN FLD QL: 0.2 MG/DL — SIGNIFICANT CHANGE UP (ref 0.2–0.2)
WBC # BLD: 5.84 K/UL — SIGNIFICANT CHANGE UP (ref 4.8–10.8)
WBC # FLD AUTO: 5.84 K/UL — SIGNIFICANT CHANGE UP (ref 4.8–10.8)

## 2019-06-17 PROCEDURE — 71045 X-RAY EXAM CHEST 1 VIEW: CPT | Mod: 26

## 2019-06-17 PROCEDURE — 70450 CT HEAD/BRAIN W/O DYE: CPT | Mod: 26

## 2019-06-17 PROCEDURE — 99285 EMERGENCY DEPT VISIT HI MDM: CPT

## 2019-06-17 PROCEDURE — 93010 ELECTROCARDIOGRAM REPORT: CPT

## 2019-06-17 RX ORDER — GABAPENTIN 400 MG/1
100 CAPSULE ORAL THREE TIMES A DAY
Refills: 0 | Status: DISCONTINUED | OUTPATIENT
Start: 2019-06-17 | End: 2019-06-18

## 2019-06-17 RX ORDER — ATORVASTATIN CALCIUM 80 MG/1
40 TABLET, FILM COATED ORAL AT BEDTIME
Refills: 0 | Status: DISCONTINUED | OUTPATIENT
Start: 2019-06-17 | End: 2019-06-18

## 2019-06-17 RX ORDER — SODIUM CHLORIDE 9 MG/ML
1000 INJECTION, SOLUTION INTRAVENOUS
Refills: 0 | Status: DISCONTINUED | OUTPATIENT
Start: 2019-06-17 | End: 2019-06-18

## 2019-06-17 RX ORDER — INSULIN GLARGINE 100 [IU]/ML
15 INJECTION, SOLUTION SUBCUTANEOUS AT BEDTIME
Refills: 0 | Status: DISCONTINUED | OUTPATIENT
Start: 2019-06-17 | End: 2019-06-18

## 2019-06-17 RX ORDER — LOSARTAN POTASSIUM 100 MG/1
50 TABLET, FILM COATED ORAL DAILY
Refills: 0 | Status: DISCONTINUED | OUTPATIENT
Start: 2019-06-17 | End: 2019-06-17

## 2019-06-17 RX ORDER — INSULIN LISPRO 100/ML
5 VIAL (ML) SUBCUTANEOUS
Refills: 0 | Status: DISCONTINUED | OUTPATIENT
Start: 2019-06-17 | End: 2019-06-18

## 2019-06-17 RX ORDER — LOSARTAN POTASSIUM 100 MG/1
1 TABLET, FILM COATED ORAL
Qty: 0 | Refills: 0 | DISCHARGE

## 2019-06-17 RX ORDER — ENOXAPARIN SODIUM 100 MG/ML
40 INJECTION SUBCUTANEOUS EVERY 24 HOURS
Refills: 0 | Status: DISCONTINUED | OUTPATIENT
Start: 2019-06-17 | End: 2019-06-18

## 2019-06-17 RX ORDER — LOSARTAN POTASSIUM 100 MG/1
100 TABLET, FILM COATED ORAL DAILY
Refills: 0 | Status: DISCONTINUED | OUTPATIENT
Start: 2019-06-17 | End: 2019-06-18

## 2019-06-17 RX ORDER — CHLORHEXIDINE GLUCONATE 213 G/1000ML
1 SOLUTION TOPICAL
Refills: 0 | Status: DISCONTINUED | OUTPATIENT
Start: 2019-06-17 | End: 2019-06-18

## 2019-06-17 RX ORDER — DEXTROSE 50 % IN WATER 50 %
12.5 SYRINGE (ML) INTRAVENOUS ONCE
Refills: 0 | Status: DISCONTINUED | OUTPATIENT
Start: 2019-06-17 | End: 2019-06-18

## 2019-06-17 RX ORDER — ASPIRIN/CALCIUM CARB/MAGNESIUM 324 MG
81 TABLET ORAL DAILY
Refills: 0 | Status: DISCONTINUED | OUTPATIENT
Start: 2019-06-17 | End: 2019-06-18

## 2019-06-17 RX ORDER — DEXTROSE 50 % IN WATER 50 %
15 SYRINGE (ML) INTRAVENOUS ONCE
Refills: 0 | Status: DISCONTINUED | OUTPATIENT
Start: 2019-06-17 | End: 2019-06-18

## 2019-06-17 RX ORDER — INSULIN LISPRO 100/ML
VIAL (ML) SUBCUTANEOUS
Refills: 0 | Status: DISCONTINUED | OUTPATIENT
Start: 2019-06-17 | End: 2019-06-18

## 2019-06-17 RX ORDER — GLUCAGON INJECTION, SOLUTION 0.5 MG/.1ML
1 INJECTION, SOLUTION SUBCUTANEOUS ONCE
Refills: 0 | Status: DISCONTINUED | OUTPATIENT
Start: 2019-06-17 | End: 2019-06-18

## 2019-06-17 RX ORDER — DEXTROSE 50 % IN WATER 50 %
25 SYRINGE (ML) INTRAVENOUS ONCE
Refills: 0 | Status: DISCONTINUED | OUTPATIENT
Start: 2019-06-17 | End: 2019-06-18

## 2019-06-17 RX ORDER — NIFEDIPINE 30 MG
60 TABLET, EXTENDED RELEASE 24 HR ORAL DAILY
Refills: 0 | Status: DISCONTINUED | OUTPATIENT
Start: 2019-06-17 | End: 2019-06-18

## 2019-06-17 RX ORDER — SODIUM CHLORIDE 9 MG/ML
1000 INJECTION INTRAMUSCULAR; INTRAVENOUS; SUBCUTANEOUS
Refills: 0 | Status: DISCONTINUED | OUTPATIENT
Start: 2019-06-17 | End: 2019-06-18

## 2019-06-17 RX ADMIN — GABAPENTIN 100 MILLIGRAM(S): 400 CAPSULE ORAL at 23:23

## 2019-06-17 RX ADMIN — INSULIN GLARGINE 15 UNIT(S): 100 INJECTION, SOLUTION SUBCUTANEOUS at 23:30

## 2019-06-17 RX ADMIN — ATORVASTATIN CALCIUM 40 MILLIGRAM(S): 80 TABLET, FILM COATED ORAL at 23:24

## 2019-06-17 RX ADMIN — SODIUM CHLORIDE 100 MILLILITER(S): 9 INJECTION INTRAMUSCULAR; INTRAVENOUS; SUBCUTANEOUS at 19:13

## 2019-06-17 NOTE — ED PROVIDER NOTE - CLINICAL SUMMARY MEDICAL DECISION MAKING FREE TEXT BOX
family aware of all labs and imaging, neurology aware of pt and following, plan for admission as discussed with pt and family, medical admitting team aware of pt and admission.

## 2019-06-17 NOTE — ED PROVIDER NOTE - ATTENDING CONTRIBUTION TO CARE
I personally evaluated the patient. I reviewed the Resident’s or Physician Assistant’s note (as assigned above), and agree with the findings and plan except as documented in my note.  A 79 y/o f w/ pmhx of dements, htn, dm, hld, presents with ~ 3 days of dizziness described as room is spinning associated with nausea, and b/l hand tremors. has had this before, seen in ed on october 3 I personally evaluated the patient. I reviewed the Resident’s or Physician Assistant’s note (as assigned above), and agree with the findings and plan except as documented in my note.  A 77 y/o f w/ pmhx of dements, htn, dm, hld, presents with ~ 3 days of dizziness described as room is spinning associated with nausea, weakness, and b/l hand tremors. has had this before, seen in ed on october 3, 2018  ahd ct head done that was negative, does not think she saw a neurologist. lives at home alone and had 24 hour aide, seven days a week. (+) chest pain- midsternal , intermittent over these past three days. denies fever, chills, vomiting, sob, pleuritic cp, palpitations, diaphoresis, cough, tinnitus, ear pain, hearing loss, neck pain/stiffness, back pain, photophobia/phonophobia, blurry vision/visual changes, abd pain, diarrhea, constipation, melena/brbpr, urinary symptoms, numbness/tingling, HA, syncope, sick contacts, recent travel or rash.   on exam:   Constitutional: wdwn female sitting on stretcher in nad.  Skin: no rash, no signs of trauma:  HEENT: PERRL, EOM intact, no nystagmus , TM's visualzied b/l w/ good cone of light, no erythema or effusions, no cerumen impaction, mmm.  NECK and BACK: neck supple, no spinous ttp to neck or back, FROM, no palpable shelves or step offs, no meningeal signs.  CARDIO: regular rate, radial pulses 2/4 b/l, dp and pt pulses 2/4 b/l.  Lungs: Ctabl w/ breath sounds present b/l, no wheezing or crackles, no accessory muscle use, no tachypnea, no stridor  ABD: BS present throughout all 4 quadrants, abd soft, nd, nt, no rebound tenderness or guarding, no cvat,;  EXT: FROM of upper and lower ext, no drift, no calf pain/swelling/erythema.  NEURO: AAOx3. Motor 4/5 and sensation intact throughout upper and lower ext. CN II-XII intact. No facial droop or slurring of speech. (-) Pronator, intention tremors noted with ftn and rapid alternating fine movements, NIH @.

## 2019-06-17 NOTE — H&P ADULT - NSHPLABSRESULTS_GEN_ALL_CORE
11.5   5.84  )-----------( 256      ( 17 Jun 2019 14:57 )             36.2       Hemoglobin: 11.5 g/dL (06-17 @ 14:57)      06-17    125<L>  |  84<L>  |  10  ----------------------------<  218<H>  3.8   |  26  |  0.9    Ca    10.0      17 Jun 2019 14:57    TPro  7.7  /  Alb  4.3  /  TBili  0.2  /  DBili  x   /  AST  14  /  ALT  11  /  AlkPhos  63  06-17        Creatinine Trend: 0.9<--  eGFR if Non African American: 60 mL/min/1.73M2 (06-17-19 @ 14:57)  eGFR if African American: 70 mL/min/1.73M2 (06-17-19 @ 14:57)    Urinalysis Basic - ( 17 Jun 2019 15:01 )    Color: Yellow / Appearance: Clear / SG: <=1.005 / pH: x  Gluc: x / Ketone: Negative  / Bili: Negative / Urobili: 0.2 mg/dL   Blood: x / Protein: Negative mg/dL / Nitrite: Negative   Leuk Esterase: Negative / RBC: 1-2 /HPF / WBC x   Sq Epi: x / Non Sq Epi: Occasional /HPF / Bacteria: x                Hemoglobin A1C         Lactate Trend      CARDIAC MARKERS ( 17 Jun 2019 15:01 )  x     / x     / 79 U/L / x     / x      CARDIAC MARKERS ( 17 Jun 2019 14:57 )  x     / <0.01 ng/mL / x     / x     / x

## 2019-06-17 NOTE — H&P ADULT - HISTORY OF PRESENT ILLNESS
78/F hx of dementia, HTN, DM, HLD presents with 3 days of vertigo, nausea, hand tremors with 1 episode of vomiting this morning. 78/F from home, lives with aide, hx of dementia (A&O x 1-2), HTN, DM, HLD presents to ED with generalized weakness with and hand tremor.     Hx from pt's daughter (Ms. Torres).    Pt was complaining of feeling weakness for the last few days. Daughter didnt think anything of it. Then today the daughter noticed a tremor to one of her arms (did not lateralize). She also reported that her mom said her entire body was shaking - though body was not shaking. She had 1 episode of vomitus and her 24hr aide called EMS. Daughter said there were no other complaints.    Med rec completed with pt's daughter. Recently had HCTz changed to chlorthalidone this month.

## 2019-06-17 NOTE — H&P ADULT - NSHPPHYSICALEXAM_GEN_ALL_CORE
Vital Signs Last 24 Hrs  T(C): 36.5 (17 Jun 2019 16:10), Max: 37.1 (17 Jun 2019 14:25)  T(F): 97.7 (17 Jun 2019 16:10), Max: 98.7 (17 Jun 2019 14:25)  HR: 77 (17 Jun 2019 16:10) (77 - 88)  BP: 119/70 (17 Jun 2019 16:10) (119/70 - 145/76)  RR: 18 (17 Jun 2019 16:10) (18 - 18)  SpO2: 98% (17 Jun 2019 16:10) (98% - 100%) Vital Signs Last 24 Hrs  T(C): 36.5 (17 Jun 2019 16:10), Max: 37.1 (17 Jun 2019 14:25)  T(F): 97.7 (17 Jun 2019 16:10), Max: 98.7 (17 Jun 2019 14:25)  HR: 77 (17 Jun 2019 16:10) (77 - 88)  BP: 119/70 (17 Jun 2019 16:10) (119/70 - 145/76)  RR: 18 (17 Jun 2019 16:10) (18 - 18)  SpO2: 98% (17 Jun 2019 16:10) (98% - 100%)    PHYSICAL EXAM:  GENERAL: NAD, speaks with few words, no signs of respiratory distress  HEAD:  Atraumatic, Normocephalic  EYES: EOMI, minimally reactive to light b/l, non-icteric, no injected sclera  NECK: Supple, No JVD  CHEST/LUNG: Clear to auscultation bilaterally; No wheeze; No crackles; No accessory muscles used  HEART: Regular rate and rhythm; No murmurs;   ABDOMEN: Soft, Nontender, Nondistended; Bowel sounds present; No guarding  EXTREMITIES:  no LE edema  PSYCH: AAOx1 (person)  NEUROLOGY: non-focal, no cerebellar signs, follows all commands, intention tremor noted  SKIN: No rashes or lesions

## 2019-06-17 NOTE — ED PROVIDER NOTE - OBJECTIVE STATEMENT
pt is a 78 yof w/ dementia, HTN, DM, HLD presents with 3 days of vertigo, nausea, hand tremors with 1 episode of vomiting this morning which prompted aide to call EMS to [facility] to ED. Family states pt is not at baseline with her functional status as she is not able to walk or take care of herself. Pt denies UTI sx, fever, chest pain, URI sx, abdominal pain, diarrhea

## 2019-06-17 NOTE — H&P ADULT - NSICDXPASTMEDICALHX_GEN_ALL_CORE_FT
PAST MEDICAL HISTORY:  Dementia     Essential hypertension     High cholesterol     Type 2 diabetes mellitus without complication, without long-term current use of insulin

## 2019-06-17 NOTE — ED ADULT NURSE NOTE - NSIMPLEMENTINTERV_GEN_ALL_ED
Implemented All Fall Risk Interventions:  [place] to call system. Call bell, personal items and telephone within reach. Instruct patient to call for assistance. Room bathroom lighting operational. Non-slip footwear when patient is off stretcher. Physically safe environment: no spills, clutter or unnecessary equipment. Stretcher in lowest position, wheels locked, appropriate side rails in place. Provide visual cue, wrist band, yellow gown, etc. Monitor gait and stability. Monitor for mental status changes and reorient to person, place, and time. Review medications for side effects contributing to fall risk. Reinforce activity limits and safety measures with patient and family.

## 2019-06-17 NOTE — ED PROVIDER NOTE - NS ED ROS FT
Eyes:  No visual changes, eye pain or discharge.  ENMT:  No hearing changes, pain, discharge or infections. No neck pain or stiffness.  Cardiac:  No chest pain, SOB or edema. No chest pain with exertion.  Respiratory:  No cough or respiratory distress. No hemoptysis. No history of asthma or RAD.  GI:  +n/v. no diarrhea or abdominal pain.  :  No dysuria, frequency or burning.  MS:  No myalgia, muscle weakness, joint pain or back pain.  Neuro:  No headache or weakness.  No LOC.  Skin:  No skin rash.   Endocrine: No history of thyroid disease or diabetes.

## 2019-06-17 NOTE — H&P ADULT - ATTENDING COMMENTS
Patient seen and examined independently. I agree with the resident's note, physical exam, and plan except as below.  Vital Signs Last 24 Hrs  T(C): 36.8 (18 Jun 2019 09:09), Max: 36.8 (18 Jun 2019 09:09)  T(F): 98.3 (18 Jun 2019 09:09), Max: 98.3 (18 Jun 2019 09:09)  HR: 79 (18 Jun 2019 09:09) (70 - 79)  BP: 162/81 (18 Jun 2019 09:09) (119/70 - 162/81)  BP(mean): --  RR: 20 (18 Jun 2019 09:09) (18 - 20)  SpO2: 99% (18 Jun 2019 09:09) (98% - 99%)  PE  nad  aao1-2  perrla, no nystagmus  [id number]  ctabl  soft ntnd +bs  poor dentitian   soft ntnd+bs  no cce  no tremors at rest or intention , no bradykinesia/rigidity  ROS: intermittent dizziness that responds to meclizine - unable to give further details, no cp, palp, sob, +gen weakness    # weakness - likely dehydration/ hyponatremia -due to chlorthalidone - recently started  Na improved with IVf  - hold diuretics  cont losartan and nifedipine - pts bp has been high because she ran out of meds and was unable to refill it bec she couldn't get in touch with pmd  #hypomagnesemia - replete  # DMII - with neuropathy - cont home meds and Neurontin   CAPILLARY BLOOD GLUCOSE      POCT Blood Glucose.: 197 mg/dL (18 Jun 2019 11:37)  POCT Blood Glucose.: 128 mg/dL (18 Jun 2019 08:03)  POCT Blood Glucose.: 234 mg/dL (17 Jun 2019 22:59)      #dementia at baseline - has 24 hour HHA - case management to assess     #intermittent dizziness - pt unable to provide further details but states responds to meclinzine     pt feels ready to go home, discussed in detail with daughter - would like to take her home and wants to find new PMD  meds reviewed, with resident,

## 2019-06-17 NOTE — H&P ADULT - ASSESSMENT
CTH - no acute path, stable volume loss, no changes from prior cth    # Acute moderate  Hyponatremia  check ur Na, ur Osm, Serum osm  check TSH, cortisol    # Microcytic anemia - baseline  Elevated RBC:Hb ratio with low MCV - s/o Hemoglobinopathy  Check Fe studies 78/F from home, lives with aide, hx of dementia (A&O x 1-2), HTN, DM, HLD presents to ED with generalized weakness with and hand tremor. CTH - no acute path, stable volume loss, no changes from prior cth. Pt Na 125. Admit to medicine for acute moderate Hyponatremia with tremor.    # Tremor, generalized weakness  Non-focal on examination - not suspecting acute CVA  Possibly related to hyponatremia?  Check B12, TSH, RPR    # Acute moderate  Hyponatremia - likely related to chlorthalidone  check ur Na, ur Osm, Serum osm  check TSH, cortisol  IVF - NS, avoid overcorrection of >6-8 in 24 hr  BMP -1130p    # Microcytic anemia - baseline  Elevated RBC:Hb ratio with low MCV - s/o Hemoglobinopathy  Check Fe studies + FOBT    # HTN - c/w losartan + nidefidipine, hold thiazide. If need BP control, can increase dose of current med or add labetalol.    # Vit D def    # DLD - c/w statin    # DM - monitor FSG, insulin basal bolus, c/w aspirin    DVT ppx  CHG bath  CC + DASH diet  Ambulate as tolerated  PT/rehab eval 78/F from home, lives with aide, hx of dementia (A&O x 1-2), HTN, DM, HLD presents to ED with generalized weakness with and hand tremor. CTH - no acute path, stable volume loss, no changes from prior cth. Pt Na 125. Admit to medicine for acute moderate Hyponatremia with tremor.    # Tremor, generalized weakness  Non-focal on examination - not suspecting acute CVA  Tremor is an intention tremor - worsens with outstretched arm  Generalized weakness - Possibly related to hyponatremia?  Check B12, TSH, RPR    # Acute moderate  Hyponatremia - likely related to chlorthalidone  check ur Na, ur Osm, Serum osm  check TSH, cortisol  IVF - NS, avoid overcorrection of >6-8 in 24 hr  BMP -1130p    # Microcytic anemia - baseline  Elevated RBC:Hb ratio with low MCV - s/o Hemoglobinopathy  Check Fe studies + FOBT    # HTN - c/w losartan + nidefidipine, hold thiazide. If need BP control, can increase dose of current med or add labetalol.    # DLD - c/w statin    # DM - monitor FSG, insulin basal bolus, c/w aspirin    DVT ppx  CHG bath  CC diet  Ambulate as tolerated  PT/rehab eval 78/F from home, lives with aide, hx of dementia (A&O x 1-2), HTN, DM, HLD presents to ED with generalized weakness with and hand tremor. CTH - no acute path, stable volume loss, no changes from prior cth. Pt Na 125. Admit to medicine for acute moderate Hyponatremia with tremor.    # Tremor, generalized weakness  Non-focal on examination - not suspecting acute CVA  Tremor is an intention tremor - worsens with outstretched arm  Generalized weakness - Possibly related to hyponatremia?  If symptoms do not improve - neurology evaluation  Check B12, TSH, RPR    # Acute moderate  Hyponatremia - likely related to chlorthalidone  check ur Na, ur Osm, Serum osm  check TSH, cortisol  IVF - NS, avoid overcorrection of >6-8 in 24 hr  BMP -1130pm    # Microcytic anemia - baseline  Elevated RBC:Hb ratio with low MCV - s/o Hemoglobinopathy  Check Fe studies + FOBT    # HTN - c/w losartan + nidefidipine, hold thiazide. If need BP control, can increase dose of current med or add labetalol.    # DLD - c/w statin    # DM - monitor FSG, insulin basal bolus, c/w aspirin - correct insulin dose based on fsg    DVT ppx  CHG bath  CC diet  Ambulate as tolerated  PT/rehab eval    Med rec confirmed with daughter

## 2019-06-17 NOTE — ED PROVIDER NOTE - PHYSICAL EXAMINATION
CONSTITUTIONAL: Well-developed; well-nourished; in no acute distress.   SKIN: warm, dry  HEAD: Normocephalic; atraumatic.  EYES: PERRL, EOMI, normal sclera and conjunctiva   ENT: No nasal discharge; airway clear.  NECK: Supple; non tender.  CARD: S1, S2 normal; no murmurs, gallops, or rubs. Regular rate and rhythm.   RESP: No wheezes, rales or rhonchi.  ABD: soft ntnd  EXT: Normal ROM.  No clubbing, cyanosis or edema.   LYMPH: No acute cervical adenopathy.  NEURO: A+O x 1, strength 5/5 in all extremities, intention tremor on finger to nose otherwise normal. CN-2-12 grossly intact  PSYCH: Cooperative, appropriate.

## 2019-06-17 NOTE — ED ADULT NURSE NOTE - OBJECTIVE STATEMENT
Pt presents to ED c/o weakness, nausea, and b/l hand tremors for 3 days. Pt states she feels like the room is spinning. Pt also reports intermittent midsternal chest pain for about a week.

## 2019-06-17 NOTE — ED PROVIDER NOTE - CARE PLAN
Assessment and plan of treatment:	Plan: EKG, CXR, labs, ct head, urine, reassess. Principal Discharge DX:	Tremor of unknown origin  Assessment and plan of treatment:	Plan: EKG, CXR, labs, ct head, urine, reassess.  Secondary Diagnosis:	Stroke Principal Discharge DX:	Tremor of unknown origin  Assessment and plan of treatment:	Plan: EKG, CXR, labs, ct head, urine, reassess.  Secondary Diagnosis:	Dizziness  Secondary Diagnosis:	Weakness

## 2019-06-18 ENCOUNTER — TRANSCRIPTION ENCOUNTER (OUTPATIENT)
Age: 79
End: 2019-06-18

## 2019-06-18 VITALS — RESPIRATION RATE: 18 BRPM | DIASTOLIC BLOOD PRESSURE: 72 MMHG | HEART RATE: 77 BPM | SYSTOLIC BLOOD PRESSURE: 132 MMHG

## 2019-06-18 LAB
ALBUMIN SERPL ELPH-MCNC: 4.1 G/DL — SIGNIFICANT CHANGE UP (ref 3.5–5.2)
ALP SERPL-CCNC: 56 U/L — SIGNIFICANT CHANGE UP (ref 30–115)
ALT FLD-CCNC: 11 U/L — SIGNIFICANT CHANGE UP (ref 0–41)
ANION GAP SERPL CALC-SCNC: 11 MMOL/L — SIGNIFICANT CHANGE UP (ref 7–14)
ANION GAP SERPL CALC-SCNC: 15 MMOL/L — HIGH (ref 7–14)
AST SERPL-CCNC: 11 U/L — SIGNIFICANT CHANGE UP (ref 0–41)
BASOPHILS # BLD AUTO: 0.05 K/UL — SIGNIFICANT CHANGE UP (ref 0–0.2)
BASOPHILS NFR BLD AUTO: 0.9 % — SIGNIFICANT CHANGE UP (ref 0–1)
BILIRUB SERPL-MCNC: 0.2 MG/DL — SIGNIFICANT CHANGE UP (ref 0.2–1.2)
BUN SERPL-MCNC: 10 MG/DL — SIGNIFICANT CHANGE UP (ref 10–20)
BUN SERPL-MCNC: 11 MG/DL — SIGNIFICANT CHANGE UP (ref 10–20)
CALCIUM SERPL-MCNC: 10 MG/DL — SIGNIFICANT CHANGE UP (ref 8.5–10.1)
CALCIUM SERPL-MCNC: 9.8 MG/DL — SIGNIFICANT CHANGE UP (ref 8.5–10.1)
CHLORIDE SERPL-SCNC: 94 MMOL/L — LOW (ref 98–110)
CHLORIDE SERPL-SCNC: 95 MMOL/L — LOW (ref 98–110)
CO2 SERPL-SCNC: 27 MMOL/L — SIGNIFICANT CHANGE UP (ref 17–32)
CO2 SERPL-SCNC: 30 MMOL/L — SIGNIFICANT CHANGE UP (ref 17–32)
CREAT SERPL-MCNC: 0.8 MG/DL — SIGNIFICANT CHANGE UP (ref 0.7–1.5)
CREAT SERPL-MCNC: 0.8 MG/DL — SIGNIFICANT CHANGE UP (ref 0.7–1.5)
EOSINOPHIL # BLD AUTO: 0.08 K/UL — SIGNIFICANT CHANGE UP (ref 0–0.7)
EOSINOPHIL NFR BLD AUTO: 1.5 % — SIGNIFICANT CHANGE UP (ref 0–8)
ESTIMATED AVERAGE GLUCOSE: 146 MG/DL — HIGH (ref 68–114)
FERRITIN SERPL-MCNC: 105 NG/ML — SIGNIFICANT CHANGE UP (ref 15–150)
FOLATE SERPL-MCNC: >20 NG/ML — SIGNIFICANT CHANGE UP
GLUCOSE BLDC GLUCOMTR-MCNC: 128 MG/DL — HIGH (ref 70–99)
GLUCOSE BLDC GLUCOMTR-MCNC: 139 MG/DL — HIGH (ref 70–99)
GLUCOSE BLDC GLUCOMTR-MCNC: 197 MG/DL — HIGH (ref 70–99)
GLUCOSE SERPL-MCNC: 182 MG/DL — HIGH (ref 70–99)
GLUCOSE SERPL-MCNC: 208 MG/DL — HIGH (ref 70–99)
HBA1C BLD-MCNC: 6.7 % — HIGH (ref 4–5.6)
HCT VFR BLD CALC: 37 % — SIGNIFICANT CHANGE UP (ref 37–47)
HGB BLD-MCNC: 11.5 G/DL — LOW (ref 12–16)
IMM GRANULOCYTES NFR BLD AUTO: 0.4 % — HIGH (ref 0.1–0.3)
IRON SATN MFR SERPL: 26 % — SIGNIFICANT CHANGE UP (ref 15–50)
IRON SATN MFR SERPL: 76 UG/DL — SIGNIFICANT CHANGE UP (ref 35–150)
LYMPHOCYTES # BLD AUTO: 1.61 K/UL — SIGNIFICANT CHANGE UP (ref 1.2–3.4)
LYMPHOCYTES # BLD AUTO: 29.2 % — SIGNIFICANT CHANGE UP (ref 20.5–51.1)
MAGNESIUM SERPL-MCNC: 1.7 MG/DL — LOW (ref 1.8–2.4)
MCHC RBC-ENTMCNC: 22.4 PG — LOW (ref 27–31)
MCHC RBC-ENTMCNC: 31.1 G/DL — LOW (ref 32–37)
MCV RBC AUTO: 72.1 FL — LOW (ref 81–99)
MONOCYTES # BLD AUTO: 0.83 K/UL — HIGH (ref 0.1–0.6)
MONOCYTES NFR BLD AUTO: 15.1 % — HIGH (ref 1.7–9.3)
NEUTROPHILS # BLD AUTO: 2.92 K/UL — SIGNIFICANT CHANGE UP (ref 1.4–6.5)
NEUTROPHILS NFR BLD AUTO: 52.9 % — SIGNIFICANT CHANGE UP (ref 42.2–75.2)
NRBC # BLD: 0 /100 WBCS — SIGNIFICANT CHANGE UP (ref 0–0)
OSMOLALITY SERPL: 293 MOS/KG — SIGNIFICANT CHANGE UP (ref 289–308)
PHOSPHATE SERPL-MCNC: 3.5 MG/DL — SIGNIFICANT CHANGE UP (ref 2.1–4.9)
PLATELET # BLD AUTO: 246 K/UL — SIGNIFICANT CHANGE UP (ref 130–400)
POTASSIUM SERPL-MCNC: 3.5 MMOL/L — SIGNIFICANT CHANGE UP (ref 3.5–5)
POTASSIUM SERPL-MCNC: 3.8 MMOL/L — SIGNIFICANT CHANGE UP (ref 3.5–5)
POTASSIUM SERPL-SCNC: 3.5 MMOL/L — SIGNIFICANT CHANGE UP (ref 3.5–5)
POTASSIUM SERPL-SCNC: 3.8 MMOL/L — SIGNIFICANT CHANGE UP (ref 3.5–5)
PROT SERPL-MCNC: 7.4 G/DL — SIGNIFICANT CHANGE UP (ref 6–8)
RBC # BLD: 5.13 M/UL — SIGNIFICANT CHANGE UP (ref 4.2–5.4)
RBC # FLD: 15.9 % — HIGH (ref 11.5–14.5)
SODIUM SERPL-SCNC: 136 MMOL/L — SIGNIFICANT CHANGE UP (ref 135–146)
SODIUM SERPL-SCNC: 136 MMOL/L — SIGNIFICANT CHANGE UP (ref 135–146)
TIBC SERPL-MCNC: 288 UG/DL — SIGNIFICANT CHANGE UP (ref 220–430)
TSH SERPL-MCNC: 1.54 UIU/ML — SIGNIFICANT CHANGE UP (ref 0.27–4.2)
UIBC SERPL-MCNC: 212 UG/DL — SIGNIFICANT CHANGE UP (ref 110–370)
VIT B12 SERPL-MCNC: >2000 PG/ML — HIGH (ref 232–1245)
WBC # BLD: 5.51 K/UL — SIGNIFICANT CHANGE UP (ref 4.8–10.8)
WBC # FLD AUTO: 5.51 K/UL — SIGNIFICANT CHANGE UP (ref 4.8–10.8)

## 2019-06-18 PROCEDURE — 99223 1ST HOSP IP/OBS HIGH 75: CPT

## 2019-06-18 RX ORDER — MECLIZINE HCL 12.5 MG
25 TABLET ORAL ONCE
Refills: 0 | Status: COMPLETED | OUTPATIENT
Start: 2019-06-18 | End: 2019-06-18

## 2019-06-18 RX ORDER — MECLIZINE HCL 12.5 MG
1 TABLET ORAL
Qty: 60 | Refills: 0
Start: 2019-06-18 | End: 2019-07-07

## 2019-06-18 RX ADMIN — Medication 2: at 11:44

## 2019-06-18 RX ADMIN — GABAPENTIN 100 MILLIGRAM(S): 400 CAPSULE ORAL at 05:38

## 2019-06-18 RX ADMIN — Medication 25 MILLIGRAM(S): at 14:42

## 2019-06-18 RX ADMIN — LOSARTAN POTASSIUM 100 MILLIGRAM(S): 100 TABLET, FILM COATED ORAL at 05:39

## 2019-06-18 RX ADMIN — Medication 60 MILLIGRAM(S): at 05:39

## 2019-06-18 RX ADMIN — GABAPENTIN 100 MILLIGRAM(S): 400 CAPSULE ORAL at 13:32

## 2019-06-18 RX ADMIN — Medication 81 MILLIGRAM(S): at 11:45

## 2019-06-18 RX ADMIN — Medication 5 UNIT(S): at 08:56

## 2019-06-18 RX ADMIN — ENOXAPARIN SODIUM 40 MILLIGRAM(S): 100 INJECTION SUBCUTANEOUS at 05:38

## 2019-06-18 RX ADMIN — Medication 5 UNIT(S): at 11:44

## 2019-06-18 NOTE — DISCHARGE NOTE PROVIDER - NSDCCPCAREPLAN_GEN_ALL_CORE_FT
PRINCIPAL DISCHARGE DIAGNOSIS  Diagnosis: Hyponatremia  Assessment and Plan of Treatment: improvement after fluids and discontinuing diuretic, hold diuretics for now. Will discontinue them on discharge, follow with pmd      SECONDARY DISCHARGE DIAGNOSES  Diagnosis: Weakness  Assessment and Plan of Treatment: has home health aid for 24 hours, improvement after fluids

## 2019-06-18 NOTE — DISCHARGE NOTE PROVIDER - CARE PROVIDER_API CALL
Ramy Jones)  Internal Medicine  [address], [address]  [place] [address]  Phone: [phone]  Fax: [phone]  Follow Up Time:

## 2019-06-18 NOTE — DISCHARGE NOTE NURSING/CASE MANAGEMENT/SOCIAL WORK - NSDCDPATPORTLINK_GEN_ALL_CORE
You can access the [facility] Portal, offered by [facility], by registering with the following website: [email or web address]

## 2019-06-18 NOTE — DISCHARGE NOTE PROVIDER - HOSPITAL COURSE
78 year old female        Generalized weakness    Non-focal on examination - not suspecting acute CVA    Tremor is an intention tremor - worsens with outstretched arm    Generalized weakness - Possibly related to hyponatremia?    Improved with fluids and discontinuation of diuretics. Acute moderate Hyponatremia, likely related to chlorthalidone and hydrochlorothiazide use        Hypertension    - continue with losartan and nidefidipine, holding the rest.     -If need BP control, can increase dose of current med or add labetalol.        Dyslipidemia      - continue with statin        Diabetes Mellitus    - monitor FSG    - continue home meds

## 2019-06-18 NOTE — DISCHARGE NOTE NURSING/CASE MANAGEMENT/SOCIAL WORK - NSSCNAMETXT_GEN_ALL_CORE
24 hour HHA resumed for tomorrow through [facility] as per Carmelina [phone].  Pt's daughter in agreement and will accompany and care for pt. today.  Ms. Torres [phone]

## 2019-06-21 DIAGNOSIS — E86.0 DEHYDRATION: ICD-10-CM

## 2019-06-21 DIAGNOSIS — R53.1 WEAKNESS: ICD-10-CM

## 2019-06-21 DIAGNOSIS — I10 ESSENTIAL (PRIMARY) HYPERTENSION: ICD-10-CM

## 2019-06-21 DIAGNOSIS — D50.9 IRON DEFICIENCY ANEMIA, UNSPECIFIED: ICD-10-CM

## 2019-06-21 DIAGNOSIS — E78.5 HYPERLIPIDEMIA, UNSPECIFIED: ICD-10-CM

## 2019-06-21 DIAGNOSIS — T50.2X5A ADVERSE EFFECT OF CARBONIC-ANHYDRASE INHIBITORS, BENZOTHIADIAZIDES AND OTHER DIURETICS, INITIAL ENCOUNTER: ICD-10-CM

## 2019-06-21 DIAGNOSIS — G25.2 OTHER SPECIFIED FORMS OF TREMOR: ICD-10-CM

## 2019-06-21 DIAGNOSIS — E87.1 HYPO-OSMOLALITY AND HYPONATREMIA: ICD-10-CM

## 2019-06-21 DIAGNOSIS — Z79.82 LONG TERM (CURRENT) USE OF ASPIRIN: ICD-10-CM

## 2019-06-21 DIAGNOSIS — Z79.84 LONG TERM (CURRENT) USE OF ORAL HYPOGLYCEMIC DRUGS: ICD-10-CM

## 2019-06-21 DIAGNOSIS — R42 DIZZINESS AND GIDDINESS: ICD-10-CM

## 2019-06-21 DIAGNOSIS — F03.90 UNSPECIFIED DEMENTIA WITHOUT BEHAVIORAL DISTURBANCE: ICD-10-CM

## 2019-06-21 DIAGNOSIS — E11.9 TYPE 2 DIABETES MELLITUS WITHOUT COMPLICATIONS: ICD-10-CM

## 2019-06-21 LAB — T PALLIDUM AB TITR SER: POSITIVE

## 2019-06-24 NOTE — CHART NOTE - NSCHARTNOTEFT_GEN_A_CORE
called by lab today for + treponemal screening - with RPR negative  called daughter rayna #in chart - aware of findings and need to follow up and repeat blood tests  states she will follow up with pts pmd , alternatively was given ID Dr Siddiqi's info phone # to follow up

## 2019-08-13 ENCOUNTER — INPATIENT (INPATIENT)
Facility: HOSPITAL | Age: 79
LOS: 2 days | Discharge: HOME HEALTH | End: 2019-08-16
Attending: HOSPITALIST | Admitting: HOSPITALIST
Payer: MEDICAID

## 2019-08-13 VITALS
RESPIRATION RATE: 18 BRPM | DIASTOLIC BLOOD PRESSURE: 86 MMHG | OXYGEN SATURATION: 98 % | SYSTOLIC BLOOD PRESSURE: 100 MMHG | TEMPERATURE: 98 F | HEART RATE: 82 BPM

## 2019-08-13 LAB
ALBUMIN SERPL ELPH-MCNC: 4.2 G/DL — SIGNIFICANT CHANGE UP (ref 3.5–5.2)
ALP SERPL-CCNC: 62 U/L — SIGNIFICANT CHANGE UP (ref 30–115)
ALT FLD-CCNC: 12 U/L — SIGNIFICANT CHANGE UP (ref 0–41)
ANION GAP SERPL CALC-SCNC: 12 MMOL/L — SIGNIFICANT CHANGE UP (ref 7–14)
APPEARANCE UR: CLEAR — SIGNIFICANT CHANGE UP
AST SERPL-CCNC: 20 U/L — SIGNIFICANT CHANGE UP (ref 0–41)
BILIRUB SERPL-MCNC: <0.2 MG/DL — SIGNIFICANT CHANGE UP (ref 0.2–1.2)
BILIRUB UR-MCNC: NEGATIVE — SIGNIFICANT CHANGE UP
BUN SERPL-MCNC: 11 MG/DL — SIGNIFICANT CHANGE UP (ref 10–20)
CALCIUM SERPL-MCNC: 10 MG/DL — SIGNIFICANT CHANGE UP (ref 8.5–10.1)
CHLORIDE SERPL-SCNC: 94 MMOL/L — LOW (ref 98–110)
CO2 SERPL-SCNC: 24 MMOL/L — SIGNIFICANT CHANGE UP (ref 17–32)
COLOR SPEC: COLORLESS — SIGNIFICANT CHANGE UP
CREAT SERPL-MCNC: 0.8 MG/DL — SIGNIFICANT CHANGE UP (ref 0.7–1.5)
DIFF PNL FLD: NEGATIVE — SIGNIFICANT CHANGE UP
GLUCOSE SERPL-MCNC: 182 MG/DL — HIGH (ref 70–99)
GLUCOSE UR QL: NEGATIVE — SIGNIFICANT CHANGE UP
HCT VFR BLD CALC: 34.9 % — LOW (ref 37–47)
HGB BLD-MCNC: 10.7 G/DL — LOW (ref 12–16)
KETONES UR-MCNC: NEGATIVE — SIGNIFICANT CHANGE UP
LEUKOCYTE ESTERASE UR-ACNC: NEGATIVE — SIGNIFICANT CHANGE UP
MCHC RBC-ENTMCNC: 22.3 PG — LOW (ref 27–31)
MCHC RBC-ENTMCNC: 30.7 G/DL — LOW (ref 32–37)
MCV RBC AUTO: 72.7 FL — LOW (ref 81–99)
NITRITE UR-MCNC: NEGATIVE — SIGNIFICANT CHANGE UP
NRBC # BLD: 0 /100 WBCS — SIGNIFICANT CHANGE UP (ref 0–0)
PH UR: 7 — SIGNIFICANT CHANGE UP (ref 5–8)
PLATELET # BLD AUTO: 273 K/UL — SIGNIFICANT CHANGE UP (ref 130–400)
POTASSIUM SERPL-MCNC: 4.7 MMOL/L — SIGNIFICANT CHANGE UP (ref 3.5–5)
POTASSIUM SERPL-SCNC: 4.7 MMOL/L — SIGNIFICANT CHANGE UP (ref 3.5–5)
PROT SERPL-MCNC: 7.6 G/DL — SIGNIFICANT CHANGE UP (ref 6–8)
PROT UR-MCNC: NEGATIVE — SIGNIFICANT CHANGE UP
RBC # BLD: 4.8 M/UL — SIGNIFICANT CHANGE UP (ref 4.2–5.4)
RBC # FLD: 15.9 % — HIGH (ref 11.5–14.5)
SODIUM SERPL-SCNC: 130 MMOL/L — LOW (ref 135–146)
SP GR SPEC: 1 — LOW (ref 1.01–1.02)
TROPONIN T SERPL-MCNC: <0.01 NG/ML — SIGNIFICANT CHANGE UP
UROBILINOGEN FLD QL: SIGNIFICANT CHANGE UP
WBC # BLD: 6.92 K/UL — SIGNIFICANT CHANGE UP (ref 4.8–10.8)
WBC # FLD AUTO: 6.92 K/UL — SIGNIFICANT CHANGE UP (ref 4.8–10.8)

## 2019-08-13 PROCEDURE — 71045 X-RAY EXAM CHEST 1 VIEW: CPT | Mod: 26

## 2019-08-13 PROCEDURE — 99285 EMERGENCY DEPT VISIT HI MDM: CPT

## 2019-08-13 PROCEDURE — 93010 ELECTROCARDIOGRAM REPORT: CPT

## 2019-08-13 RX ORDER — ERGOCALCIFEROL 1.25 MG/1
1 CAPSULE ORAL
Qty: 0 | Refills: 0 | DISCHARGE

## 2019-08-13 RX ORDER — GABAPENTIN 400 MG/1
100 CAPSULE ORAL THREE TIMES A DAY
Refills: 0 | Status: DISCONTINUED | OUTPATIENT
Start: 2019-08-13 | End: 2019-08-16

## 2019-08-13 RX ORDER — ASPIRIN/CALCIUM CARB/MAGNESIUM 324 MG
1 TABLET ORAL
Qty: 0 | Refills: 0 | DISCHARGE

## 2019-08-13 RX ORDER — ENOXAPARIN SODIUM 100 MG/ML
40 INJECTION SUBCUTANEOUS DAILY
Refills: 0 | Status: DISCONTINUED | OUTPATIENT
Start: 2019-08-13 | End: 2019-08-16

## 2019-08-13 RX ORDER — MECLIZINE HCL 12.5 MG
25 TABLET ORAL THREE TIMES A DAY
Refills: 0 | Status: DISCONTINUED | OUTPATIENT
Start: 2019-08-13 | End: 2019-08-15

## 2019-08-13 RX ORDER — ATORVASTATIN CALCIUM 80 MG/1
40 TABLET, FILM COATED ORAL AT BEDTIME
Refills: 0 | Status: DISCONTINUED | OUTPATIENT
Start: 2019-08-13 | End: 2019-08-16

## 2019-08-13 RX ORDER — SODIUM CHLORIDE 9 MG/ML
1000 INJECTION INTRAMUSCULAR; INTRAVENOUS; SUBCUTANEOUS
Refills: 0 | Status: DISCONTINUED | OUTPATIENT
Start: 2019-08-13 | End: 2019-08-15

## 2019-08-13 RX ORDER — LOSARTAN POTASSIUM 100 MG/1
100 TABLET, FILM COATED ORAL DAILY
Refills: 0 | Status: DISCONTINUED | OUTPATIENT
Start: 2019-08-13 | End: 2019-08-16

## 2019-08-13 RX ORDER — LOSARTAN POTASSIUM 100 MG/1
1 TABLET, FILM COATED ORAL
Qty: 0 | Refills: 0 | DISCHARGE

## 2019-08-13 RX ORDER — NIFEDIPINE 30 MG
60 TABLET, EXTENDED RELEASE 24 HR ORAL DAILY
Refills: 0 | Status: DISCONTINUED | OUTPATIENT
Start: 2019-08-13 | End: 2019-08-16

## 2019-08-13 RX ORDER — ASPIRIN/CALCIUM CARB/MAGNESIUM 324 MG
81 TABLET ORAL DAILY
Refills: 0 | Status: DISCONTINUED | OUTPATIENT
Start: 2019-08-13 | End: 2019-08-16

## 2019-08-13 RX ORDER — METFORMIN HYDROCHLORIDE 850 MG/1
1 TABLET ORAL
Qty: 0 | Refills: 0 | DISCHARGE

## 2019-08-13 RX ORDER — EMPAGLIFLOZIN 10 MG/1
1 TABLET, FILM COATED ORAL
Qty: 0 | Refills: 0 | DISCHARGE

## 2019-08-13 RX ORDER — GABAPENTIN 400 MG/1
1 CAPSULE ORAL
Qty: 0 | Refills: 0 | DISCHARGE

## 2019-08-13 RX ORDER — NIFEDIPINE 30 MG
1 TABLET, EXTENDED RELEASE 24 HR ORAL
Qty: 0 | Refills: 0 | DISCHARGE

## 2019-08-13 RX ORDER — CHLORHEXIDINE GLUCONATE 213 G/1000ML
1 SOLUTION TOPICAL
Refills: 0 | Status: DISCONTINUED | OUTPATIENT
Start: 2019-08-13 | End: 2019-08-16

## 2019-08-13 RX ORDER — ROSUVASTATIN CALCIUM 5 MG/1
1 TABLET ORAL
Qty: 0 | Refills: 0 | DISCHARGE

## 2019-08-13 RX ADMIN — GABAPENTIN 100 MILLIGRAM(S): 400 CAPSULE ORAL at 22:38

## 2019-08-13 RX ADMIN — ATORVASTATIN CALCIUM 40 MILLIGRAM(S): 80 TABLET, FILM COATED ORAL at 22:37

## 2019-08-13 RX ADMIN — Medication 25 MILLIGRAM(S): at 22:38

## 2019-08-13 NOTE — ED PROVIDER NOTE - NS ED ROS FT
Eyes:  No visual changes, eye pain or discharge.  ENMT:  No hearing changes, pain, no sore throat or runny nose, no difficulty swallowing  Cardiac:  No chest pain, SOB or edema. No chest pain with exertion.  Respiratory:  No cough or respiratory distress.    GI:  No nausea, vomiting, diarrhea or abdominal pain.  :  No dysuria, frequency or burning.  MS:  No myalgia. +muscle weakness. no joint pain or back pain.  Neuro:  No headache . +weakness.  No LOC.  Skin:  No skin rash.   Endocrine: No history of thyroid disease or diabetes.

## 2019-08-13 NOTE — H&P ADULT - ATTENDING COMMENTS
Patient is seen and examined independently at bedside. I agree with the resident's note, history and plan as above. Corrections made where appropriate    All labs, radiologic studies, vitals, orders and medications list reviewed.     #Progress Note Handoff  Pending (specify):  PT/rehab  Family discussion: awaiting family's arrival  Disposition:  home with PT versus SNF

## 2019-08-13 NOTE — ED PROVIDER NOTE - CLINICAL SUMMARY MEDICAL DECISION MAKING FREE TEXT BOX
Patient presented with generalized weakness, failure to thrive. Otherwise afebrile, HD stable, fully neuro intact, abd non-tender. Obtained EKG which showed non-specific changes, but troponin negative, labs otherwise unremarkable, including negative UA. CXR negative as well. Patient given IVF in ED with mild improvement, but per daughter patient is too weak to go home and is requesting admission. Will admit for further monitoring and management. Family agreeable with plan. HD stable at time of admission.

## 2019-08-13 NOTE — ED ADULT NURSE NOTE - NSIMPLEMENTINTERV_GEN_ALL_ED
Implemented All Fall with Harm Risk Interventions:  [place] to call system. Call bell, personal items and telephone within reach. Instruct patient to call for assistance. Room bathroom lighting operational. Non-slip footwear when patient is off stretcher. Physically safe environment: no spills, clutter or unnecessary equipment. Stretcher in lowest position, wheels locked, appropriate side rails in place. Provide visual cue, wrist band, yellow gown, etc. Monitor gait and stability. Monitor for mental status changes and reorient to person, place, and time. Review medications for side effects contributing to fall risk. Reinforce activity limits and safety measures with patient and family. Provide visual clues: red socks.

## 2019-08-13 NOTE — H&P ADULT - NSHPLABSRESULTS_GEN_ALL_CORE
10.7   6.92  )-----------( 273      ( 13 Aug 2019 16:40 )             34.9       08-13    130<L>  |  94<L>  |  11  ----------------------------<  182<H>  4.7   |  24  |  0.8    Ca    10.0      13 Aug 2019 16:40    TPro  7.6  /  Alb  4.2  /  TBili  <0.2  /  DBili  x   /  AST  20  /  ALT  12  /  AlkPhos  62  08-              Urinalysis Basic - ( 13 Aug 2019 16:39 )    Color: Colorless / Appearance: Clear / S.004 / pH: x  Gluc: x / Ketone: Negative  / Bili: Negative / Urobili: <2 mg/dL   Blood: x / Protein: Negative / Nitrite: Negative   Leuk Esterase: Negative / RBC: x / WBC x   Sq Epi: x / Non Sq Epi: x / Bacteria: x            Lactate Trend      CARDIAC MARKERS ( 13 Aug 2019 16:40 )  x     / <0.01 ng/mL / x     / x     / x            CAPILLARY BLOOD GLUCOSE      < from: 12 Lead ECG (19 @ 16:58) >    Diagnosis Line Normal sinus rhythm  Left axis deviation  Septal infarct , age undetermined  Abnormal ECG    < end of copied text >

## 2019-08-13 NOTE — ED PROVIDER NOTE - OBJECTIVE STATEMENT
78yF with PMH HTN,  HLD, DM, dementia at baseline aaoX1-2 p/w weakness yesterday and today, gradual onset, progressive, constant, generalized, a/w shaking. no ha vision changes ams fnd cp sob fever chills back pain abd pain n/v/d urinary sx rash or swelling. pt is ambulatory with walker at baseline.

## 2019-08-13 NOTE — ED PROVIDER NOTE - PMH
Dementia    Essential hypertension    High cholesterol    Type 2 diabetes mellitus without complication, without long-term current use of insulin

## 2019-08-13 NOTE — ED ADULT NURSE NOTE - OBJECTIVE STATEMENT
Pt c/o generalized weakness and shaking. Pt is A&Ox1-2, knows her name, where she is, and able to state her complaint, but does not remember her date of birth. Denies chest pain, SOB, fever, chills, numbness, or tingling.

## 2019-08-13 NOTE — ED PROVIDER NOTE - CARE PLAN
Principal Discharge DX:	Weakness Principal Discharge DX:	Weakness  Secondary Diagnosis:	Failure to thrive in adult

## 2019-08-13 NOTE — H&P ADULT - NSHPPHYSICALEXAM_GEN_ALL_CORE
PHYSICAL EXAM:  GENERAL: NAD, well-developed  HEAD:  Atraumatic, Normocephalic  EYES: EOMI, PERRLA, conjunctiva and sclera clear  NECK: Supple, No JVD  CHEST/LUNG: Clear to auscultation bilaterally; No wheeze  HEART: Regular rate and rhythm; No murmurs, rubs, or gallops  ABDOMEN: Soft, Nontender, Nondistended; Bowel sounds present  EXTREMITIES:  2+ Peripheral Pulses, No clubbing, cyanosis, or edema  PSYCH: AAOx3  NEUROLOGY: non-focal  SKIN: No rashes or lesions PHYSICAL EXAM:  GENERAL: NAD, well-developed, resting in bed  HEAD:  Atraumatic, Normocephalic  EYES: conjunctiva and sclera clear  NECK: Supple, No JVD  CHEST/LUNG: Clear to auscultation bilaterally; No wheeze  HEART: Regular rate and rhythm; No murmurs, rubs, or gallops  ABDOMEN: Soft, Nontender, Nondistended; Bowel sounds present  EXTREMITIES:  No clubbing, cyanosis, or edema  PSYCH: AAOx1  NEUROLOGY: non-focal  SKIN: No rashes or lesions

## 2019-08-13 NOTE — ED PROVIDER NOTE - PHYSICAL EXAMINATION
Vital Signs: I have reviewed the initial vital signs.  Constitutional: NAD, well-nourished, appears stated age, no acute distress.  HEENT: Airway patent, moist MM, no erythema/swelling/deformity of oral structures. EOMI, PERRLA.  CV: regular rate, regular rhythm, well-perfused extremities, 2+ b/l DP and radial pulses equal.  Lungs: BCTA, no increased WOB.  ABD: NTND, no guarding or rebound, no pulsatile mass, no hernias.   MSK: Neck supple, nontender, nl ROM, no stepoff. Chest nontender. Back nontender in TLS spine or to b/l bony structures or flanks. Ext nontender, nl rom, no deformity.   INTEG: Skin warm, dry, no rash.  NEURO: A&Ox2, moving all extremities, normal accented speech  PSYCH: Calm, cooperative, normal affect and interaction.

## 2019-08-13 NOTE — H&P ADULT - HISTORY OF PRESENT ILLNESS
Pt is a 78/F from home, lives with aide, hx of dementia (A&O x 1-2), HTN, DM, HLD presents to ED with generalized weakness and shaking.   History obtained from health aid at bedside.  History of present illness dates back to this morning, when pt complained of being weak and had shaking of her body. At baseline, pt walks with a walker as per health aid. As per aid, pt occasionally has non localized shaking of her body that resolves on it own. No tremors shaking present at the moment. Denies any fever, chills, Nausea, vomiting, difficulty breathing, chest pain, GI/  complaints.     Pt had a recent admission in june 2019 for similar non specific weakness/ shaking and was found to have Low Na- that was treated with fluids and medicines were adjusted. Pt was also tested for syphillis and found to have +TPA and -ve RPR.     VS on arrival- 98.2F, HR 82, /86, RR 18.   labs revealed Na 130, Microcytic anemia. Pt is a 78/F from home, lives with aide, hx of dementia (A&O x 1-2), HTN, DM, HLD presents to ED with generalized weakness and shaking.   History obtained from health aid at bedside.  History of present illness dates back to this morning, when pt complained of being weak and had shaking of her body. At baseline, pt walks with a walker as per health aid. As per aid, pt occasionally has non localized shaking of her body that resolves on it own. No tremors shaking present at the moment. Denies any fever, chills, Nausea, vomiting, difficulty breathing, chest pain, GI/  complaints.     Pt had a recent admission in june 2019 for similar non specific weakness/ shaking and was found to have Low Na- that was treated with fluids and medicines were adjusted. Pt was also tested for syphillis and found to have +TPA and -ve RPR. Pt or family not aware of any h/o or treatment for syphillis in the past.     VS on arrival- 98.2F, HR 82, /86, RR 18.   labs revealed Na 130, Microcytic anemia. Pt is a 78/F from home, lives with aide, hx of dementia (A&O x 1-2), HTN, DM, HLD presents to ED with generalized weakness and shaking.   History obtained from health aid at bedside.  History of present illness dates back to morning of presentation, when pt complained of being weak and had shaking of her body. At baseline, pt walks with a walker as per health aid. As per aid, pt occasionally has non localized shaking of her body that resolves on it own. No tremors shaking present at the moment. Denies any fever, chills, Nausea, vomiting, difficulty breathing, chest pain, GI/  complaints.     Pt had a recent admission in june 2019 for similar non specific weakness/ shaking and was found to have Low Na- that was treated with fluids and medicines were adjusted. Pt was also tested for syphillis and found to have +TPA and -ve RPR. Pt or family not aware of any h/o or treatment for syphillis in the past.     VS on arrival- 98.2F, HR 82, /86, RR 18.   labs revealed Na 130, Microcytic anemia.

## 2019-08-13 NOTE — ED PROVIDER NOTE - ATTENDING CONTRIBUTION TO CARE
78 year old female, pmhx HTN, HLD, DM, dementia, presenting with generalized weakness since yesterday that has been progressive, constant, no obvious palliative or provocative factors. Per daughter, patient also having decreased PO intake over this time as well. Patient ambulatory with walker at baseline but has not felt strong enough to ambulate the past few days. Otherwise denies fevers, headache, vision changes, numbness, confusion, URI symptoms, neck pain, chest pain, back pain, dyspnea, cough, palpitations, nausea, vomiting, abdominal pain, diarrhea, constipation, blood in stool/dark stools, urinary symptoms, vaginal bleeding/discharge, leg swelling, rash, recent travel or sick contacts.    Vital Signs: I have reviewed the initial vital signs.  Constitutional: NAD, well-nourished, appears stated age, no acute distress.  HEENT: Airway patent, moist MM, no erythema/swelling/deformity of oral structures. EOMI, PERRLA.  CV: regular rate, regular rhythm, well-perfused extremities, 2+ b/l DP and radial pulses equal.  Lungs: BCTA, no increased WOB.  ABD: NTND, no guarding or rebound, no pulsatile mass, no hernias.   MSK: Neck supple, nontender, nl ROM, no stepoff. Chest nontender. Back nontender in TLS spine or to b/l bony structures or flanks. Ext nontender, nl rom, no deformity.   INTEG: Skin warm, dry, no rash.  NEURO: A&Ox2 (baseline), normal strength, nl sensation throughout, normal speech.   PSYCH: Calm, cooperative, normal affect and interaction.    Abd non-tender. Fully neuro intact. Will obtain labs, UA, give IVF, re-eval.

## 2019-08-13 NOTE — H&P ADULT - ASSESSMENT
78/F from home, lives with aide, hx of dementia (A&O x 1-2), HTN, DM, HLD presents to ED with generalized weakness and shaking.     #) Generalized weakness with occasional non localized tremor ( 2/2 Hyponatremia vs hypotension)  - tremor non focal- doubt CVA  - No evidence of sepsis, UA clean, no WBC, no fevers/ chills, CXR no opacity  - monitor cbc, fever curve, f/u blood cx.  - start IV hydration with NS   - If symptoms do not improve - neurology evaluation  - B12 and TSH normal in june 2019.   - PT phys eval.       #  Hyponatremia ( 2/2 possible poor nutritional status)  -check ur Na, ur Osm, Serum osm  - start on Iv fluids  - f/u BMP in AM    # Microcytic anemia - baseline  -Check Fe studies  - check FOBT    #H/o  HTN   - Pt BP is 100/86 on admission, possibly contributing to generalized weakness.   - hold home meds of losartan, nifedipine for now  - restart as tolerated.     # DLD - c/w statin    # DM  -monitor FSG  -f/u hba1c  - hold home OHA  - start insulin for FS > 180.       DVT ppx  CHG bath  CC diet  Ambulate as tolerated  PT/rehab eval 78/F from home, lives with aide, hx of dementia (A&O x 1-2), HTN, DM, HLD presents to ED with generalized weakness and shaking.     #) Generalized weakness with occasional non localized tremor ( 2/2 Hyponatremia vs hypotension)  - tremor non focal- doubt CVA  - No evidence of sepsis, UA clean, no WBC, no fevers/ chills, CXR no opacity  - monitor cbc, fever curve, f/u blood cx.  - start IV hydration with NS   - If symptoms do not improve - neurology evaluation  - B12 and TSH normal in june 2019.   - PT phys eval.       #  Hyponatremia ( 2/2 possible poor nutritional status)  -check ur Na, ur Osm, Serum osm  - start on Iv fluids  - f/u BMP in AM    # Microcytic anemia - baseline  -Check Fe studies  - check FOBT    #H/o  HTN   - Pt BP is 100/86 on admission, possibly contributing to generalized weakness.   - home meds of losartan, nifedipine- start with holding parameters.   - monitor BP and adjust meds.     # DLD - c/w statin    # DM  -monitor FSG  -f/u hba1c  - hold home OHA  - start insulin for FS > 180.       DVT ppx  CHG bath  CC diet  Ambulate as tolerated  PT/rehab eval 78/F from home, lives with aide, hx of dementia (A&O x 1-2), HTN, DM, HLD presents to ED with generalized weakness and shaking.     #) Generalized weakness with occasional non localized tremor ( 2/2 Hyponatremia vs hypotension)  - tremor non focal- doubt CVA  - No evidence of sepsis, UA clean, no WBC, no fevers/ chills, CXR no opacity  - monitor cbc, fever curve, f/u blood cx.  - start IV hydration with NS   - If symptoms do not improve - neurology evaluation  - B12 and TSH normal in june 2019.   - PT phys eval.       #  Hyponatremia ( 2/2 possible poor nutritional status)  -check ur Na, ur Osm, Serum osm  - start on Iv fluids  - f/u BMP in AM    # Microcytic anemia - baseline  -Check Fe studies  - check FOBT    # Syphillis testing on last admission  - RPR negative  - +ve TPA? h/o syphillis?  - consider ID follow up as outpt.       #H/o  HTN   - Pt BP is 100/86 on admission, possibly contributing to generalized weakness.   - home meds of losartan, nifedipine- start with holding parameters.   - monitor BP and adjust meds.     # DLD - c/w statin    # DM  -monitor FSG  -f/u hba1c  - hold home OHA  - start insulin for FS > 180.       DVT ppx  CHG bath  CC diet  Ambulate as tolerated  PT/rehab eval 78/F from home, lives with aide, hx of dementia (A&O x 1-2), HTN, DM, HLD presents to ED with generalized weakness and shaking.     #) Generalized weakness with occasional non localized tremor ( 2/2 Hyponatremia vs hypotension)  - tremor non focal- doubt CVA, hyponatremia resolved  - No evidence of sepsis, UA clean, no WBC, no fevers/ chills, CXR no opacity  - monitor cbc, fever curve, f/u blood cx.  - If symptoms do not improve - neurology evaluation  - B12 and TSH normal in june 2019.   - PT phys eval appreciated: SNF versus home with PT    #  Hyponatremia ( 2/2 possible poor nutritional status)  - now resolved  - dc Iv fluids  - f/u BMP in AM    # Microcytic anemia - baseline  -Check Fe studies  - check FOBT    # Syphillis testing on last admission  - RPR negative  - +ve TPA? h/o syphillis?  - consider ID follow up as outpt.     #H/o  HTN   - Pt BP is 100/86 on admission, possibly contributing to generalized weakness.   - home meds of losartan, nifedipine- continue with holding parameters.   - monitor BP and adjust meds.     # DLD - c/w statin    # DM  -monitor FSG  -f/u hba1c  - hold home OHA  - start insulin for FS > 180.     DVT ppx  CHG bath  CC diet  Ambulate as tolerated  PT/rehab eval

## 2019-08-14 LAB
ALBUMIN SERPL ELPH-MCNC: 3.6 G/DL — SIGNIFICANT CHANGE UP (ref 3.5–5.2)
ALP SERPL-CCNC: 52 U/L — SIGNIFICANT CHANGE UP (ref 30–115)
ALT FLD-CCNC: 9 U/L — SIGNIFICANT CHANGE UP (ref 0–41)
ANION GAP SERPL CALC-SCNC: 9 MMOL/L — SIGNIFICANT CHANGE UP (ref 7–14)
AST SERPL-CCNC: 10 U/L — SIGNIFICANT CHANGE UP (ref 0–41)
BASOPHILS # BLD AUTO: 0.06 K/UL — SIGNIFICANT CHANGE UP (ref 0–0.2)
BASOPHILS NFR BLD AUTO: 1.2 % — HIGH (ref 0–1)
BILIRUB SERPL-MCNC: <0.2 MG/DL — SIGNIFICANT CHANGE UP (ref 0.2–1.2)
BUN SERPL-MCNC: 10 MG/DL — SIGNIFICANT CHANGE UP (ref 10–20)
CALCIUM SERPL-MCNC: 9.7 MG/DL — SIGNIFICANT CHANGE UP (ref 8.5–10.1)
CHLORIDE SERPL-SCNC: 103 MMOL/L — SIGNIFICANT CHANGE UP (ref 98–110)
CO2 SERPL-SCNC: 28 MMOL/L — SIGNIFICANT CHANGE UP (ref 17–32)
CREAT SERPL-MCNC: 0.8 MG/DL — SIGNIFICANT CHANGE UP (ref 0.7–1.5)
EOSINOPHIL # BLD AUTO: 0.2 K/UL — SIGNIFICANT CHANGE UP (ref 0–0.7)
EOSINOPHIL NFR BLD AUTO: 4.1 % — SIGNIFICANT CHANGE UP (ref 0–8)
ESTIMATED AVERAGE GLUCOSE: 148 MG/DL — HIGH (ref 68–114)
FERRITIN SERPL-MCNC: 70 NG/ML — SIGNIFICANT CHANGE UP (ref 15–150)
GLUCOSE BLDC GLUCOMTR-MCNC: 120 MG/DL — HIGH (ref 70–99)
GLUCOSE BLDC GLUCOMTR-MCNC: 188 MG/DL — HIGH (ref 70–99)
GLUCOSE SERPL-MCNC: 147 MG/DL — HIGH (ref 70–99)
HBA1C BLD-MCNC: 6.8 % — HIGH (ref 4–5.6)
HCT VFR BLD CALC: 32.1 % — LOW (ref 37–47)
HGB BLD-MCNC: 9.8 G/DL — LOW (ref 12–16)
IMM GRANULOCYTES NFR BLD AUTO: 0 % — LOW (ref 0.1–0.3)
IRON SATN MFR SERPL: 32 % — SIGNIFICANT CHANGE UP (ref 15–50)
IRON SATN MFR SERPL: 76 UG/DL — SIGNIFICANT CHANGE UP (ref 35–150)
LYMPHOCYTES # BLD AUTO: 2.14 K/UL — SIGNIFICANT CHANGE UP (ref 1.2–3.4)
LYMPHOCYTES # BLD AUTO: 43.8 % — SIGNIFICANT CHANGE UP (ref 20.5–51.1)
MAGNESIUM SERPL-MCNC: 1.8 MG/DL — SIGNIFICANT CHANGE UP (ref 1.8–2.4)
MCHC RBC-ENTMCNC: 22.3 PG — LOW (ref 27–31)
MCHC RBC-ENTMCNC: 30.5 G/DL — LOW (ref 32–37)
MCV RBC AUTO: 73 FL — LOW (ref 81–99)
MONOCYTES # BLD AUTO: 0.62 K/UL — HIGH (ref 0.1–0.6)
MONOCYTES NFR BLD AUTO: 12.7 % — HIGH (ref 1.7–9.3)
NEUTROPHILS # BLD AUTO: 1.87 K/UL — SIGNIFICANT CHANGE UP (ref 1.4–6.5)
NEUTROPHILS NFR BLD AUTO: 38.2 % — LOW (ref 42.2–75.2)
NRBC # BLD: 0 /100 WBCS — SIGNIFICANT CHANGE UP (ref 0–0)
OSMOLALITY SERPL: 296 MOSMOL/KG — SIGNIFICANT CHANGE UP (ref 280–301)
PLATELET # BLD AUTO: 239 K/UL — SIGNIFICANT CHANGE UP (ref 130–400)
POTASSIUM SERPL-MCNC: 4.9 MMOL/L — SIGNIFICANT CHANGE UP (ref 3.5–5)
POTASSIUM SERPL-SCNC: 4.9 MMOL/L — SIGNIFICANT CHANGE UP (ref 3.5–5)
PROT SERPL-MCNC: 6.5 G/DL — SIGNIFICANT CHANGE UP (ref 6–8)
RBC # BLD: 4.4 M/UL — SIGNIFICANT CHANGE UP (ref 4.2–5.4)
RBC # FLD: 15.8 % — HIGH (ref 11.5–14.5)
SODIUM SERPL-SCNC: 140 MMOL/L — SIGNIFICANT CHANGE UP (ref 135–146)
TIBC SERPL-MCNC: 240 UG/DL — SIGNIFICANT CHANGE UP (ref 220–430)
UIBC SERPL-MCNC: 164 UG/DL — SIGNIFICANT CHANGE UP (ref 110–370)
WBC # BLD: 4.89 K/UL — SIGNIFICANT CHANGE UP (ref 4.8–10.8)
WBC # FLD AUTO: 4.89 K/UL — SIGNIFICANT CHANGE UP (ref 4.8–10.8)

## 2019-08-14 PROCEDURE — 99222 1ST HOSP IP/OBS MODERATE 55: CPT

## 2019-08-14 RX ADMIN — Medication 81 MILLIGRAM(S): at 13:54

## 2019-08-14 RX ADMIN — Medication 25 MILLIGRAM(S): at 13:54

## 2019-08-14 RX ADMIN — GABAPENTIN 100 MILLIGRAM(S): 400 CAPSULE ORAL at 05:41

## 2019-08-14 RX ADMIN — Medication 60 MILLIGRAM(S): at 05:42

## 2019-08-14 RX ADMIN — LOSARTAN POTASSIUM 100 MILLIGRAM(S): 100 TABLET, FILM COATED ORAL at 05:42

## 2019-08-14 RX ADMIN — ENOXAPARIN SODIUM 40 MILLIGRAM(S): 100 INJECTION SUBCUTANEOUS at 13:54

## 2019-08-14 RX ADMIN — GABAPENTIN 100 MILLIGRAM(S): 400 CAPSULE ORAL at 21:54

## 2019-08-14 RX ADMIN — Medication 25 MILLIGRAM(S): at 21:54

## 2019-08-14 RX ADMIN — Medication 25 MILLIGRAM(S): at 05:42

## 2019-08-14 RX ADMIN — ATORVASTATIN CALCIUM 40 MILLIGRAM(S): 80 TABLET, FILM COATED ORAL at 21:54

## 2019-08-14 RX ADMIN — GABAPENTIN 100 MILLIGRAM(S): 400 CAPSULE ORAL at 13:54

## 2019-08-14 NOTE — PHYSICAL THERAPY INITIAL EVALUATION ADULT - LEVEL OF INDEPENDENCE: STAIR NEGOTIATION, REHAB EVAL
unable to perform/Did not attempt due to pt reporting dizziness. See vitals at General Observations.

## 2019-08-14 NOTE — ED ADULT NURSE REASSESSMENT NOTE - NS ED NURSE REASSESS COMMENT FT1
pt received from previous shift  awake alert and interactive  pt offers no complaints at this time  VS WNL  pt pending bed assignment, will continue care.

## 2019-08-14 NOTE — PHYSICAL THERAPY INITIAL EVALUATION ADULT - GENERAL OBSERVATIONS, REHAB EVAL
PT IE 2:30-3pm. Pt supine in NAD. +call bell, +bed alarm, no c/o pain. Presents with slight confusion and c/o dizziness following ambulation. Vitals: /79, HR 71, SPO2 100% at room air. KATHRYN Chopra aware.

## 2019-08-14 NOTE — CONSULT NOTE ADULT - SUBJECTIVE AND OBJECTIVE BOX
HPI:  Pt is a 78/F from home, lives with aide, hx of dementia (A&O x 1-2), HTN, DM, HLD presents to ED with generalized weakness and shaking.   History obtained from health aid at bedside.  History of present illness dates back to this morning, when pt complained of being weak and had shaking of her body. At baseline, pt walks with a walker as per health aid. As per aid, pt occasionally has non localized shaking of her body that resolves on it own. No tremors shaking present at the moment. Denies any fever, chills, Nausea, vomiting, difficulty breathing, chest pain, GI/  complaints.     Pt had a recent admission in 2019 for similar non specific weakness/ shaking and was found to have Low Na- that was treated with fluids and medicines were adjusted. Pt was also tested for syphillis and found to have +TPA and -ve RPR. Pt or family not aware of any h/o or treatment for syphillis in the past.     VS on arrival- 98.2F, HR 82, /86, RR 18.   labs revealed Na 130, Microcytic anemia. (13 Aug 2019 20:32)      PAST MEDICAL & SURGICAL HISTORY:  Dementia  Essential hypertension  Type 2 diabetes mellitus without complication, without long-term current use of insulin  High cholesterol  No significant past surgical history      Hospital Course:    TODAY'S SUBJECTIVE & REVIEW OF SYMPTOMS:     Constitutional WNL   Cardio WNL   Resp WNL   GI WNL  Heme WNL  Endo WNL  Skin WNL  MSK Weakness  Neuro WNL  Cognitive confused  Psych WNL      MEDICATIONS  (STANDING):  aspirin  chewable 81 milliGRAM(s) Oral daily  atorvastatin 40 milliGRAM(s) Oral at bedtime  chlorhexidine 4% Liquid 1 Application(s) Topical <User Schedule>  enoxaparin Injectable 40 milliGRAM(s) SubCutaneous daily  gabapentin 100 milliGRAM(s) Oral three times a day  losartan 100 milliGRAM(s) Oral daily  meclizine 25 milliGRAM(s) Oral three times a day  NIFEdipine XL 60 milliGRAM(s) Oral daily  sodium chloride 0.9%. 1000 milliLiter(s) (60 mL/Hr) IV Continuous <Continuous>    MEDICATIONS  (PRN):      FAMILY HISTORY:  No pertinent family history in first degree relatives      Allergies    No Known Allergies    Intolerances        SOCIAL HISTORY:    [  ] Etoh  [  ] Smoking  [  ] Substance abuse     Home Environment:  [  ] Home Alone  [  ] Lives with Family  [x  ] Home Health Aid    Dwelling:  [x  ] Apartment  [  ] Private House  [  ] Adult Home  [  ] Skilled Nursing Facility      [  ] Short Term  [  ] Long Term  [  ] Stairs       Elevator [  ]    FUNCTIONAL STATUS PTA: (Check all that apply)  Ambulation: [   ]Independent    [  ] Dependent     [  ] Non-Ambulatory  Assistive Device: [  ] SA Cane  [  ]  Q Cane  [x  ] Walker  [  ]  Wheelchair  ADL : [  ] Independent  [ x ]  Dependent       Vital Signs Last 24 Hrs  T(C): 36.6 (14 Aug 2019 07:59), Max: 36.8 (13 Aug 2019 15:49)  T(F): 97.8 (14 Aug 2019 07:59), Max: 98.2 (13 Aug 2019 15:49)  HR: 69 (14 Aug 2019 07:59) (69 - 82)  BP: 123/55 (14 Aug 2019 07:59) (100/86 - 123/55)  BP(mean): --  RR: 16 (14 Aug 2019 07:59) (16 - 18)  SpO2: 100% (14 Aug 2019 07:59) (98% - 100%)      PHYSICAL EXAM: Awake / confused  GENERAL: NAD  HEAD:  Atraumatic, Normocephalic  CHEST/LUNG: Clear   HEART: S1S2+  ABDOMEN: Soft, Nontender  EXTREMITIES:  no calf tenderness    NERVOUS SYSTEM:  Cranial Nerves 2-12 intact [  ] Abnormal  [  ]  ROM: WFL all extremities [x  ]  Abnormal [  ]  Motor Strength: WFL all extremities  [  ]  Abnormal [x  ]4/5 all ext  Sensation: intact to light touch [x  ] Abnormal [  ]  Reflexes: Symmetric [  ]  Abnormal [  ]    FUNCTIONAL STATUS:  Bed Mobility: Independent [  ]  Supervision [  ]  Needs Assistance [x  ]  N/A [  ]  Transfers: Independent [  ]  Supervision [  ]  Needs Assistance [ x ]  N/A [  ]   Ambulation: Independent [  ]  Supervision [  ]  Needs Assistance [  ]  N/A [  ]  ADL: Independent [  ] Requires Assistance [  ] N/A [  ]      LABS:                        9.8    4.89  )-----------( 239      ( 14 Aug 2019 05:31 )             32.1     08-14    140  |  103  |  10  ----------------------------<  147<H>  4.9   |  28  |  0.8    Ca    9.7      14 Aug 2019 05:31  Mg     1.8     -    TPro  6.5  /  Alb  3.6  /  TBili  <0.2  /  DBili  x   /  AST  10  /  ALT  9   /  AlkPhos  52        Urinalysis Basic - ( 13 Aug 2019 16:39 )    Color: Colorless / Appearance: Clear / S.004 / pH: x  Gluc: x / Ketone: Negative  / Bili: Negative / Urobili: <2 mg/dL   Blood: x / Protein: Negative / Nitrite: Negative   Leuk Esterase: Negative / RBC: x / WBC x   Sq Epi: x / Non Sq Epi: x / Bacteria: x        RADIOLOGY & ADDITIONAL STUDIES:    Assesment:

## 2019-08-14 NOTE — CONSULT NOTE ADULT - ASSESSMENT
IMPRESSION: Rehab of gait dysfunction    PRECAUTIONS: [  ] Cardiac  [  ] Respiratory  [  ] Seizures [  ] Contact Isolation  [  ] Droplet Isolation  [  ] Other    Weight Bearing Status:     RECOMMENDATION:    Out of Bed to Chair     DVT/Decubiti Prophylaxis    REHAB PLAN:     [x   ] Bedside P/T 3-5 times a week   [   ]   Bedside O/T  2-3 times a week             [   ] No Rehab Therapy Indicated                   [   ]  Speech Therapy   Conditioning/ROM                                    ADL  Bed Mobility                                               Conditioning/ROM  Transfers                                                     Bed Mobility  Sitting /Standing Balance                         Transfers                                        Gait Training                                               Sitting/Standing Balance  Stair Training [   ]Applicable                    Home equipment Eval                                                                        Splinting  [   ] Only      GOALS:   ADL   [   ]   Independent                    Transfers  [ x  ] Independent                          Ambulation  [ x  ] Independent     [ x   ] With device                            [   ]  CG                                                         [   ]  CG                                                                  [   ] CG                            [    ] Min A                                                   [   ] Min A                                                              [   ] Min  A          DISCHARGE PLAN:   [   ]  Good candidate for Intensive Rehabilitation/Hospital based                                             Will tolerate 3hrs Intensive Rehab Daily                                       [  x  ]  Short Term Rehab in Skilled Nursing Facility                        vs               [  x  ]  Home with Outpatient or VN services                                         [    ]  Possible Candidate for Intensive Hospital based Rehab

## 2019-08-15 ENCOUNTER — TRANSCRIPTION ENCOUNTER (OUTPATIENT)
Age: 79
End: 2019-08-15

## 2019-08-15 LAB
ANION GAP SERPL CALC-SCNC: 8 MMOL/L — SIGNIFICANT CHANGE UP (ref 7–14)
APPEARANCE UR: CLEAR — SIGNIFICANT CHANGE UP
BASOPHILS # BLD AUTO: 0.08 K/UL — SIGNIFICANT CHANGE UP (ref 0–0.2)
BASOPHILS NFR BLD AUTO: 1.5 % — HIGH (ref 0–1)
BILIRUB UR-MCNC: NEGATIVE — SIGNIFICANT CHANGE UP
BUN SERPL-MCNC: 16 MG/DL — SIGNIFICANT CHANGE UP (ref 10–20)
CALCIUM SERPL-MCNC: 9.9 MG/DL — SIGNIFICANT CHANGE UP (ref 8.5–10.1)
CHLORIDE SERPL-SCNC: 105 MMOL/L — SIGNIFICANT CHANGE UP (ref 98–110)
CO2 SERPL-SCNC: 28 MMOL/L — SIGNIFICANT CHANGE UP (ref 17–32)
COLOR SPEC: COLORLESS — SIGNIFICANT CHANGE UP
CREAT SERPL-MCNC: 0.9 MG/DL — SIGNIFICANT CHANGE UP (ref 0.7–1.5)
CULTURE RESULTS: SIGNIFICANT CHANGE UP
DIFF PNL FLD: NEGATIVE — SIGNIFICANT CHANGE UP
EOSINOPHIL # BLD AUTO: 0.24 K/UL — SIGNIFICANT CHANGE UP (ref 0–0.7)
EOSINOPHIL NFR BLD AUTO: 4.6 % — SIGNIFICANT CHANGE UP (ref 0–8)
GLUCOSE BLDC GLUCOMTR-MCNC: 157 MG/DL — HIGH (ref 70–99)
GLUCOSE BLDC GLUCOMTR-MCNC: 163 MG/DL — HIGH (ref 70–99)
GLUCOSE BLDC GLUCOMTR-MCNC: 165 MG/DL — HIGH (ref 70–99)
GLUCOSE BLDC GLUCOMTR-MCNC: 171 MG/DL — HIGH (ref 70–99)
GLUCOSE BLDC GLUCOMTR-MCNC: 327 MG/DL — HIGH (ref 70–99)
GLUCOSE SERPL-MCNC: 164 MG/DL — HIGH (ref 70–99)
GLUCOSE UR QL: NEGATIVE — SIGNIFICANT CHANGE UP
HCT VFR BLD CALC: 35.5 % — LOW (ref 37–47)
HGB BLD-MCNC: 10.7 G/DL — LOW (ref 12–16)
IMM GRANULOCYTES NFR BLD AUTO: 0.2 % — SIGNIFICANT CHANGE UP (ref 0.1–0.3)
KETONES UR-MCNC: NEGATIVE — SIGNIFICANT CHANGE UP
LEUKOCYTE ESTERASE UR-ACNC: NEGATIVE — SIGNIFICANT CHANGE UP
LYMPHOCYTES # BLD AUTO: 2.05 K/UL — SIGNIFICANT CHANGE UP (ref 1.2–3.4)
LYMPHOCYTES # BLD AUTO: 39.3 % — SIGNIFICANT CHANGE UP (ref 20.5–51.1)
MCHC RBC-ENTMCNC: 22.2 PG — LOW (ref 27–31)
MCHC RBC-ENTMCNC: 30.1 G/DL — LOW (ref 32–37)
MCV RBC AUTO: 73.5 FL — LOW (ref 81–99)
MONOCYTES # BLD AUTO: 0.59 K/UL — SIGNIFICANT CHANGE UP (ref 0.1–0.6)
MONOCYTES NFR BLD AUTO: 11.3 % — HIGH (ref 1.7–9.3)
NEUTROPHILS # BLD AUTO: 2.24 K/UL — SIGNIFICANT CHANGE UP (ref 1.4–6.5)
NEUTROPHILS NFR BLD AUTO: 43.1 % — SIGNIFICANT CHANGE UP (ref 42.2–75.2)
NITRITE UR-MCNC: NEGATIVE — SIGNIFICANT CHANGE UP
NRBC # BLD: 0 /100 WBCS — SIGNIFICANT CHANGE UP (ref 0–0)
PH UR: 7 — SIGNIFICANT CHANGE UP (ref 5–8)
PLATELET # BLD AUTO: 259 K/UL — SIGNIFICANT CHANGE UP (ref 130–400)
POTASSIUM SERPL-MCNC: 4.2 MMOL/L — SIGNIFICANT CHANGE UP (ref 3.5–5)
POTASSIUM SERPL-SCNC: 4.2 MMOL/L — SIGNIFICANT CHANGE UP (ref 3.5–5)
PROT UR-MCNC: NEGATIVE — SIGNIFICANT CHANGE UP
RBC # BLD: 4.83 M/UL — SIGNIFICANT CHANGE UP (ref 4.2–5.4)
RBC # FLD: 16.2 % — HIGH (ref 11.5–14.5)
SODIUM SERPL-SCNC: 141 MMOL/L — SIGNIFICANT CHANGE UP (ref 135–146)
SP GR SPEC: 1.01 — LOW (ref 1.01–1.02)
SPECIMEN SOURCE: SIGNIFICANT CHANGE UP
UROBILINOGEN FLD QL: SIGNIFICANT CHANGE UP
WBC # BLD: 5.21 K/UL — SIGNIFICANT CHANGE UP (ref 4.8–10.8)
WBC # FLD AUTO: 5.21 K/UL — SIGNIFICANT CHANGE UP (ref 4.8–10.8)

## 2019-08-15 PROCEDURE — 99231 SBSQ HOSP IP/OBS SF/LOW 25: CPT

## 2019-08-15 RX ORDER — GLUCAGON INJECTION, SOLUTION 0.5 MG/.1ML
1 INJECTION, SOLUTION SUBCUTANEOUS ONCE
Refills: 0 | Status: DISCONTINUED | OUTPATIENT
Start: 2019-08-15 | End: 2019-08-16

## 2019-08-15 RX ORDER — DEXTROSE 50 % IN WATER 50 %
25 SYRINGE (ML) INTRAVENOUS ONCE
Refills: 0 | Status: DISCONTINUED | OUTPATIENT
Start: 2019-08-15 | End: 2019-08-16

## 2019-08-15 RX ORDER — DEXTROSE 50 % IN WATER 50 %
12.5 SYRINGE (ML) INTRAVENOUS ONCE
Refills: 0 | Status: DISCONTINUED | OUTPATIENT
Start: 2019-08-15 | End: 2019-08-16

## 2019-08-15 RX ORDER — SODIUM CHLORIDE 9 MG/ML
1000 INJECTION, SOLUTION INTRAVENOUS
Refills: 0 | Status: DISCONTINUED | OUTPATIENT
Start: 2019-08-15 | End: 2019-08-16

## 2019-08-15 RX ORDER — INSULIN LISPRO 100/ML
4 VIAL (ML) SUBCUTANEOUS ONCE
Refills: 0 | Status: COMPLETED | OUTPATIENT
Start: 2019-08-15 | End: 2019-08-15

## 2019-08-15 RX ORDER — INSULIN GLARGINE 100 [IU]/ML
18 INJECTION, SOLUTION SUBCUTANEOUS AT BEDTIME
Refills: 0 | Status: DISCONTINUED | OUTPATIENT
Start: 2019-08-15 | End: 2019-08-16

## 2019-08-15 RX ORDER — INSULIN LISPRO 100/ML
6 VIAL (ML) SUBCUTANEOUS
Refills: 0 | Status: DISCONTINUED | OUTPATIENT
Start: 2019-08-15 | End: 2019-08-16

## 2019-08-15 RX ORDER — INSULIN LISPRO 100/ML
VIAL (ML) SUBCUTANEOUS
Refills: 0 | Status: DISCONTINUED | OUTPATIENT
Start: 2019-08-15 | End: 2019-08-16

## 2019-08-15 RX ORDER — DEXTROSE 50 % IN WATER 50 %
15 SYRINGE (ML) INTRAVENOUS ONCE
Refills: 0 | Status: DISCONTINUED | OUTPATIENT
Start: 2019-08-15 | End: 2019-08-16

## 2019-08-15 RX ADMIN — Medication 25 MILLIGRAM(S): at 13:29

## 2019-08-15 RX ADMIN — Medication 4 UNIT(S): at 12:42

## 2019-08-15 RX ADMIN — Medication 6 UNIT(S): at 17:18

## 2019-08-15 RX ADMIN — Medication 60 MILLIGRAM(S): at 05:10

## 2019-08-15 RX ADMIN — ENOXAPARIN SODIUM 40 MILLIGRAM(S): 100 INJECTION SUBCUTANEOUS at 11:47

## 2019-08-15 RX ADMIN — Medication 81 MILLIGRAM(S): at 11:47

## 2019-08-15 RX ADMIN — GABAPENTIN 100 MILLIGRAM(S): 400 CAPSULE ORAL at 05:10

## 2019-08-15 RX ADMIN — LOSARTAN POTASSIUM 100 MILLIGRAM(S): 100 TABLET, FILM COATED ORAL at 05:10

## 2019-08-15 RX ADMIN — ATORVASTATIN CALCIUM 40 MILLIGRAM(S): 80 TABLET, FILM COATED ORAL at 21:24

## 2019-08-15 RX ADMIN — CHLORHEXIDINE GLUCONATE 1 APPLICATION(S): 213 SOLUTION TOPICAL at 05:09

## 2019-08-15 RX ADMIN — GABAPENTIN 100 MILLIGRAM(S): 400 CAPSULE ORAL at 13:29

## 2019-08-15 RX ADMIN — INSULIN GLARGINE 18 UNIT(S): 100 INJECTION, SOLUTION SUBCUTANEOUS at 22:46

## 2019-08-15 RX ADMIN — Medication 25 MILLIGRAM(S): at 05:10

## 2019-08-15 RX ADMIN — GABAPENTIN 100 MILLIGRAM(S): 400 CAPSULE ORAL at 21:24

## 2019-08-15 RX ADMIN — Medication 1: at 17:18

## 2019-08-15 NOTE — DISCHARGE NOTE NURSING/CASE MANAGEMENT/SOCIAL WORK - NSDCDPATPORTLINK_GEN_ALL_CORE
You can access the [facility] Portal, offered by [facility], by registering with the following website: [email or web address]

## 2019-08-15 NOTE — DIETITIAN INITIAL EVALUATION ADULT. - DIET TYPE
DASH/TLC (sodium and cholesterol restricted diet)/+ Glucerna BID/consistent carbohydrate (no snacks)

## 2019-08-15 NOTE — DIETITIAN INITIAL EVALUATION ADULT. - OTHER INFO
Pt presented to ED c/o weakness and shaking- 2/2 hyponatremia vs hypotension. Noted hx HTN, DLD, DM.

## 2019-08-15 NOTE — PROGRESS NOTE ADULT - SUBJECTIVE AND OBJECTIVE BOX
BARBARA GOODRICH 78y Female  MRN#: [id number]     SUBJECTIVE  Patient is a 78y old Female who presents with a chief complaint of Weakness (15 Aug 2019 16:09)      Today is hospital day 2d, and this morning she is lying in bed without distress. Denies pain, endorses feeling generalized weakness. Says she does eat at home, but per family she does not eat a lot because she wants to keep her Finger stick glucose down.   No acute overnight events.     OBJECTIVE  PAST MEDICAL & SURGICAL HISTORY  Dementia  Essential hypertension  Type 2 diabetes mellitus without complication, without long-term current use of insulin  High cholesterol  No significant past surgical history    ALLERGIES:  No Known Allergies    MEDICATIONS:  STANDING MEDICATIONS  aspirin  chewable 81 milliGRAM(s) Oral daily  atorvastatin 40 milliGRAM(s) Oral at bedtime  chlorhexidine 4% Liquid 1 Application(s) Topical <User Schedule>  dextrose 5%. 1000 milliLiter(s) IV Continuous <Continuous>  dextrose 50% Injectable 12.5 Gram(s) IV Push once  dextrose 50% Injectable 25 Gram(s) IV Push once  dextrose 50% Injectable 25 Gram(s) IV Push once  enoxaparin Injectable 40 milliGRAM(s) SubCutaneous daily  gabapentin 100 milliGRAM(s) Oral three times a day  insulin glargine Injectable (LANTUS) 18 Unit(s) SubCutaneous at bedtime  insulin lispro (HumaLOG) corrective regimen sliding scale   SubCutaneous three times a day before meals  insulin lispro Injectable (HumaLOG) 6 Unit(s) SubCutaneous three times a day before meals  losartan 100 milliGRAM(s) Oral daily  meclizine 25 milliGRAM(s) Oral three times a day  NIFEdipine XL 60 milliGRAM(s) Oral daily    PRN MEDICATIONS  dextrose 40% Gel 15 Gram(s) Oral once PRN  glucagon  Injectable 1 milliGRAM(s) IntraMuscular once PRN    HOME MEDICATIONS  Home Medications:  aspirin 81 mg oral tablet, chewable: 1 tab(s) orally once a day (13 Aug 2019 20:49)  ergocalciferol 50,000 intl units (1.25 mg) oral capsule: 1 cap(s) orally once a week (13 Aug 2019 20:49)  gabapentin 100 mg oral capsule: 1 cap(s) orally 3 times a day (13 Aug 2019 20:49)  glipiZIDE 5 mg oral tablet: 1 tab(s) orally once a day (13 Aug 2019 20:49)  Jardiance 10 mg oral tablet: 1 tab(s) orally once a day (in the morning) (13 Aug 2019 20:49)  losartan 100 mg oral tablet: 1 tab(s) orally once a day (13 Aug 2019 20:49)  metFORMIN 500 mg oral tablet: 1 tab(s) orally 2 times a day (13 Aug 2019 20:49)  NIFEdipine 60 mg oral tablet, extended release: 1 tab(s) orally once a day (13 Aug 2019 20:49)  rosuvastatin 10 mg oral tablet: 1 tab(s) orally once a day (at bedtime) (13 Aug 2019 20:49)      LABS:                        10.7   5.21  )-----------( 259      ( 15 Aug 2019 07:53 )             35.5     08-15    141  |  105  |  16  ----------------------------<  164<H>  4.2   |  28  |  0.9    Ca    9.9      15 Aug 2019 07:53  Mg     1.8     14    TPro  6.5  /  Alb  3.6  /  TBili  <0.2  /  DBili  x   /  AST  10  /  ALT  9   /  AlkPhos  52  08-14    LIVER FUNCTIONS - ( 14 Aug 2019 05:31 )  Alb: 3.6 g/dL / Pro: 6.5 g/dL / ALK PHOS: 52 U/L / ALT: 9 U/L / AST: 10 U/L / GGT: x             Urinalysis Basic - ( 15 Aug 2019 10:35 )    Color: Colorless / Appearance: Clear / S.008 / pH: x  Gluc: x / Ketone: Negative  / Bili: Negative / Urobili: <2 mg/dL   Blood: x / Protein: Negative / Nitrite: Negative   Leuk Esterase: Negative / RBC: x / WBC x   Sq Epi: x / Non Sq Epi: x / Bacteria: x            Culture - Blood (collected 14 Aug 2019 00:27)  Source: .Blood None  Preliminary Report (15 Aug 2019 07:01):    No growth to date.    Culture - Urine (collected 13 Aug 2019 16:39)  Source: .Urine Clean Catch (Midstream)  Final Report (15 Aug 2019 00:32):    <10,000 CFU/mL Normal Urogenital Clarice          CAPILLARY BLOOD GLUCOSE      POCT Blood Glucose.: 171 mg/dL (15 Aug 2019 16:32)      EKG, IMAGING:    PHYSICAL EXAM:  T(C): 36.6 (08-15-19 @ 12:24), Max: 36.6 (08-15-19 @ 05:59)  HR: 80 (08-15-19 @ 12:24) (80 - 86)  BP: 129/73 (08-15-19 @ 12:24) (128/61 - 156/74)  RR: 18 (08-15-19 @ 12:24) (16 - 18)  SpO2: 98% (08-15-19 @ 02:14) (97% - 98%)    GENERAL: NAD, well-developed, 78y  EENT: EOMI, conjunctiva and sclera clear, No nasal obstruction or discharge  RESPIRATORY: Anterior fields clear to auscultation bilaterally; No wheeze or crackles, patient did not want to turn for posterior exam  CARDIOVASCULAR: Regular rate and rhythm; No murmurs, rubs, or gallops, no LE edema  GASTROINTESTINAL: Abdomen Soft, Nontender, Nondistended; Bowel sounds present  MUSCULOSKELETAL:  No cyanosis, extremities grossly symmetrical  NEUROLOGY: non-focal, cognition intact, MAEE, 3/5 strength BUE, BLE    ADMISSION SUMMARY  Patient is a 78y old Female who presents with a chief complaint of Weakness (15 Aug 2019 16:09)

## 2019-08-15 NOTE — PROGRESS NOTE ADULT - SUBJECTIVE AND OBJECTIVE BOX
CHIEF COMPLAINT:    Patient is a 78y old  Female who presents with a chief complaint of Weakness (14 Aug 2019 13:54)      INTERVAL HPI/OVERNIGHT EVENTS:    Patient seen and examined at bedside. No acute overnight events occurred.    ROS: All other systems are negative.    Vital Signs:    T(F): 97.9 (08-15-19 @ 12:24), Max: 98.3 (08-14-19 @ 16:12)  HR: 80 (08-15-19 @ 12:24) (80 - 86)  BP: 129/73 (08-15-19 @ 12:24) (128/61 - 156/74)  RR: 18 (08-15-19 @ 12:24) (16 - 184)  SpO2: 98% (08-15-19 @ 02:14) (97% - 99%)  I&O's Summary    15 Aug 2019 07:01  -  15 Aug 2019 16:09  --------------------------------------------------------  IN: 210 mL / OUT: 450 mL / NET: -240 mL      Daily Height in cm: 157.48 (15 Aug 2019 01:51)    Daily   CAPILLARY BLOOD GLUCOSE      POCT Blood Glucose.: 327 mg/dL (15 Aug 2019 11:43)  POCT Blood Glucose.: 157 mg/dL (15 Aug 2019 08:04)  POCT Blood Glucose.: 188 mg/dL (14 Aug 2019 16:57)      PHYSICAL EXAM:  GENERAL:  NAD  SKIN: No rashes or lesions  HEENT: Atraumatic. Normocephalic. Anicteric  NECK:  No JVD.   PULMONARY: Clear to ausculation bilaterally. No wheezing. No rales  CVS: Normal S1, S2. Regular rate and rhythm. No murmurs.  ABDOMEN/GI: Soft, Nontender, Nondistended; Bowel sounds are present  EXTREMITIES:  no edema b/l  NEUROLOGIC:  4/5 lower extremity strength, slight intention tremor  PSYCH: Alert & oriented x 3, normal affect    Consultant(s) Notes Reviewed:  [x ] YES  [ ] NO  Care Discussed with Consultants/Other Providers [ x] YES  [ ] NO    LABS:                        10.7   5.21  )-----------( 259      ( 15 Aug 2019 07:53 )             35.5     08-15    141  |  105  |  16  ----------------------------<  164<H>  4.2   |  28  |  0.9    Ca    9.9      15 Aug 2019 07:53  Mg     1.8     08-14    TPro  6.5  /  Alb  3.6  /  TBili  <0.2  /  DBili  x   /  AST  10  /  ALT  9   /  AlkPhos  52  08-14        Trop <0.01, CKMB --, CK --, 08-13-19 @ 16:40      Culture - Blood (collected 14 Aug 2019 00:27)  Source: .Blood None  Preliminary Report (15 Aug 2019 07:01):    No growth to date.    Culture - Urine (collected 13 Aug 2019 16:39)  Source: .Urine Clean Catch (Midstream)  Final Report (15 Aug 2019 00:32):    <10,000 CFU/mL Normal Urogenital Clarice    RADIOLOGY & ADDITIONAL TESTS:  No new imaging    Medications:  Standing  aspirin  chewable 81 milliGRAM(s) Oral daily  atorvastatin 40 milliGRAM(s) Oral at bedtime  chlorhexidine 4% Liquid 1 Application(s) Topical <User Schedule>  dextrose 5%. 1000 milliLiter(s) IV Continuous <Continuous>  dextrose 50% Injectable 12.5 Gram(s) IV Push once  dextrose 50% Injectable 25 Gram(s) IV Push once  dextrose 50% Injectable 25 Gram(s) IV Push once  enoxaparin Injectable 40 milliGRAM(s) SubCutaneous daily  gabapentin 100 milliGRAM(s) Oral three times a day  insulin glargine Injectable (LANTUS) 18 Unit(s) SubCutaneous at bedtime  insulin lispro (HumaLOG) corrective regimen sliding scale   SubCutaneous three times a day before meals  insulin lispro Injectable (HumaLOG) 6 Unit(s) SubCutaneous three times a day before meals  losartan 100 milliGRAM(s) Oral daily  meclizine 25 milliGRAM(s) Oral three times a day  NIFEdipine XL 60 milliGRAM(s) Oral daily  sodium chloride 0.9%. 1000 milliLiter(s) IV Continuous <Continuous>    PRN Meds  dextrose 40% Gel 15 Gram(s) Oral once PRN  glucagon  Injectable 1 milliGRAM(s) IntraMuscular once PRN      Case discussed with resident  Care discussed with pt

## 2019-08-15 NOTE — DIETITIAN INITIAL EVALUATION ADULT. - RD TO REMAIN AVAILABLE
RD to monitor diet order, energy intake, NFPF, body comp, glucose and renal profile. Pt at risk f/u 4 days/yes

## 2019-08-15 NOTE — DIETITIAN INITIAL EVALUATION ADULT. - ENERGY NEEDS
estimated energy needs: 1265-1370kcal (MSJx1.2-1.3AF)  estimated protein needs: 63-75g (1.0-1.2g/kg)  estimated fluid needs: 1ml/kcal

## 2019-08-15 NOTE — DIETITIAN INITIAL EVALUATION ADULT. - FACTORS AFF FOOD INTAKE
Pt is with dementia AAOx1-2 per EMR. Daughter at bedside. Reports pt normally with good appetite and PO intake, however, poor x2 weeks PTA, says due to weakness. Pt was only consuming liquids at that time. Agreeable to glucerna BID as pt was taking glucerna at home BID. No prior vit/min supplementation. Pt needs soft foods d/t missing teeth. NKFA. Last BM PTA, unknown. Pt is with dementia AAOx1-2 per EMR. Caregiver at bedside. Reports pt normally with good appetite and PO intake, however, poor x2 weeks PTA, says due to weakness. Pt was only consuming liquids at that time. Agreeable to glucerna BID as pt was taking glucerna at home BID. No prior vit/min supplementation. Pt needs soft foods d/t missing teeth. NKFA. Last BM PTA, unknown.

## 2019-08-15 NOTE — DIETITIAN INITIAL EVALUATION ADULT. - PHYSICAL APPEARANCE
BMI: 25.3. IBW: 110#+/-10%. Daughter suspects wt loss, however, UBW unknown. Pt does not meet criteria for PCM given current information. BMI: 25.3. IBW: 110#+/-10%.  Caregiver suspects wt loss, however, UBW unknown. Pt does not meet criteria for PCM given current information.

## 2019-08-15 NOTE — PROGRESS NOTE ADULT - ASSESSMENT
________________________________________________________________________________  DAILY PROGRESS NOTE:    ================== HOSPITAL COURSE ==================    ================== SUBJECTIVE ==================    BARBARA GOODRICH  /   78y  /  Female  /  MRN#: [id number]  Patient is a 78y old Female who presents with a chief complaint of Weakness (14 Aug 2019 10:30)  Currently admitted to medicine with the primary diagnosis of Weakness    HOSPITAL DAY: 1d     OVERNIGHT EVENTS: None    TODAY: Patient was seen this morning at bedside. Currently, the patient reports some belly pain but not severe     Review of systems is otherwise negative.    =================== OBJECTIVE ===================    VITAL SIGNS: Last 24 Hours  T(C): 36.6 (14 Aug 2019 07:59), Max: 36.8 (13 Aug 2019 15:49)  T(F): 97.8 (14 Aug 2019 07:59), Max: 98.2 (13 Aug 2019 15:49)  HR: 69 (14 Aug 2019 07:59) (69 - 82)  BP: 123/55 (14 Aug 2019 07:59) (100/86 - 123/55)  BP(mean): --  RR: 16 (14 Aug 2019 07:59) (16 - 18)  SpO2: 100% (14 Aug 2019 07:59) (98% - 100%)    PHYSICAL EXAM:  GENERAL: NAD, well-developed  HEAD:  Atraumatic, Normocephalic  EYES: EOMI, PERRLA, conjunctiva and sclera clear  NECK: Supple, No JVD  CHEST/LUNG: Clear to auscultation bilaterally; No wheeze  HEART: Regular rate and rhythm; No murmurs, rubs, or gallops  ABDOMEN: Soft, Nontender, Nondistended; Bowel sounds present  EXTREMITIES:  2+ Peripheral Pulses, No clubbing, cyanosis, or edema  PSYCH: AAOx3  NEUROLOGY: non-focal  SKIN: No rashes or lesions    ===================== LABS =====================                        9.8    4.89  )-----------( 239      ( 14 Aug 2019 05:31 )             32.1         140  |  103  |  10  ----------------------------<  147<H>  4.9   |  28  |  0.8    Ca    9.7      14 Aug 2019 05:31  Mg     1.8         TPro  6.5  /  Alb  3.6  /  TBili  <0.2  /  DBili  x   /  AST  10  /  ALT  9   /  AlkPhos  52        Urinalysis Basic - ( 13 Aug 2019 16:39 )    Color: Colorless / Appearance: Clear / S.004 / pH: x  Gluc: x / Ketone: Negative  / Bili: Negative / Urobili: <2 mg/dL   Blood: x / Protein: Negative / Nitrite: Negative   Leuk Esterase: Negative / RBC: x / WBC x   Sq Epi: x / Non Sq Epi: x / Bacteria: x        Troponin T, Serum: <0.01 ng/mL (19 @ 16:40)    CARDIAC MARKERS ( 13 Aug 2019 16:40 )  x     / <0.01 ng/mL / x     / x     / x        ================== ALLERGIES ===================  No Known Allergies      ==================== MEDS =====================  aspirin  chewable 81 milliGRAM(s) Oral daily  atorvastatin 40 milliGRAM(s) Oral at bedtime  chlorhexidine 4% Liquid 1 Application(s) Topical <User Schedule>  enoxaparin Injectable 40 milliGRAM(s) SubCutaneous daily  gabapentin 100 milliGRAM(s) Oral three times a day  losartan 100 milliGRAM(s) Oral daily  meclizine 25 milliGRAM(s) Oral three times a day  NIFEdipine XL 60 milliGRAM(s) Oral daily  sodium chloride 0.9%. 1000 milliLiter(s) IV Continuous <Continuous>    PRN MEDICATIONS      ================= ASSESSMENT/PLAN ==================  78/F from home, lives with aide, hx of dementia (A&O x 1-2), HTN, DM, HLD presents to ED with generalized weakness and shaking.     #) Generalized weakness with occasional non localized tremor ( 2/2 Hyponatremia vs hypotension)  - Sodium now 140  - No evidence of sepsis, UA clean, no WBC, no fevers/ chills, CXR no opacity  - Continue IV hydration with NS for today   - B12 and TSH normal in 2019.   - PT phys eval.     # Microcytic anemia - baseline  - Iron studies not consistent with TINO   - check FOBT    # Syphillis testing on last admission  - RPR negative  - +ve TPA? h/o syphillis?    #H/o  HTN   - Pt BP is 100/86 on admission, possibly contributing to generalized weakness.   - home meds of losartan, nifedipine- start with holding parameters.   - monitor BP and adjust meds.     # DLD - c/w statin    # DM  -monitor FSG  -f/u hba1c  - hold home OHA  - start insulin for FS > 180.       DVT ppx  CHG bath  CC diet  Ambulate as tolerated  PT/rehab eval
78/F from home, lives with aide, hx of dementia HTN, DM, HLD presented to ED with generalized weakness and shaking. Pt was admitted in 6/2019 for same symptoms. As per daughter pt acutely developed weakness and shaking. Shaking is described as tremor with action, no seizure like activity. Pt states she has good appetite but is afraid to eat because she doesn't want to cause her FS to become elevated. At home she only walks once or twice per day from couch to bedroom. She otherwise lies on couch all day.    #Generalized weakness, chronic and progressive. Likely debility. Family does not want SNF, agree to home physical therapy   -Hold meclizine   -Physical therapy     #Chronic microcytic anemia. Iron studies not consistent with TINO. Patient asymptomatic.  -Continue to monitor  -Follow up outpatient primary care provider     #Syphillis testing on last admission with RPR negative but confirmatory testing positive  - d/w ID    #HTN  -c/w losartan, nifedipine    #DLD   - c/w lipitor, ASA    #DM II  -Insulin and Finger stick glucose  -Gabapentin    #DVT PPX: Lovenox  #GI PPX: Not indicated  #Diet: DASH, CC  #Activity: With assistance, fall precautions  #Dispo: To home with physical therapy  #CODE: FULL
78/F from home, lives with aide, hx of dementia HTN, DM, HLD presents to ED with generalized weakness and shaking. Pt was admitted in 6/2019 for same symptoms. As per daughter pt acutely developed weakness and shaking. Shaking is described as tremor with action, no seizure like activity. Pt states she has good appetite but is afraid to eat because she doesn't want to cause her FS to become elevated. At home she only walks once or twice per day from couch to bedroom. She otherwise lies on couch all day.    Generalized weakness  - likely functional decline due in mobility at home  - family declining SNF  - agreeable to home pt  - taking meclizine which can cause drowsiness- will hold hold   Microcytic anemia   - Iron studies not consistent with TINO   - RDW elevated  - pt asympomatic, given poor functional status recommend against further workup      Syphillis testing on last admission  - RPR negative but confirmatory testing positive  - will d/w ID    HTN   - well controlled  -c/w losartain, nifedeipine    DLD - c/w lipitor    DM II  - c/w insulin  - FS goal 110-180    #Progress Note Handoff:  Pending (specify):  Home pt  Family discussion: d/w daughter at bedside. Agreeable with plan as above  Disposition: Home_x__/SNF___/Other________/Unknown at this time________

## 2019-08-16 ENCOUNTER — TRANSCRIPTION ENCOUNTER (OUTPATIENT)
Age: 79
End: 2019-08-16

## 2019-08-16 VITALS
DIASTOLIC BLOOD PRESSURE: 68 MMHG | RESPIRATION RATE: 18 BRPM | TEMPERATURE: 97 F | HEART RATE: 83 BPM | SYSTOLIC BLOOD PRESSURE: 150 MMHG

## 2019-08-16 LAB
GLUCOSE BLDC GLUCOMTR-MCNC: 132 MG/DL — HIGH (ref 70–99)
GLUCOSE BLDC GLUCOMTR-MCNC: 219 MG/DL — HIGH (ref 70–99)
GLUCOSE BLDC GLUCOMTR-MCNC: 280 MG/DL — HIGH (ref 70–99)

## 2019-08-16 PROCEDURE — 99238 HOSP IP/OBS DSCHRG MGMT 30/<: CPT

## 2019-08-16 RX ADMIN — Medication 81 MILLIGRAM(S): at 12:05

## 2019-08-16 RX ADMIN — GABAPENTIN 100 MILLIGRAM(S): 400 CAPSULE ORAL at 05:37

## 2019-08-16 RX ADMIN — Medication 60 MILLIGRAM(S): at 05:37

## 2019-08-16 RX ADMIN — ENOXAPARIN SODIUM 40 MILLIGRAM(S): 100 INJECTION SUBCUTANEOUS at 12:05

## 2019-08-16 RX ADMIN — Medication 6 UNIT(S): at 12:04

## 2019-08-16 RX ADMIN — Medication 3: at 12:04

## 2019-08-16 RX ADMIN — LOSARTAN POTASSIUM 100 MILLIGRAM(S): 100 TABLET, FILM COATED ORAL at 05:37

## 2019-08-16 RX ADMIN — Medication 2: at 17:17

## 2019-08-16 RX ADMIN — Medication 6 UNIT(S): at 08:29

## 2019-08-16 RX ADMIN — GABAPENTIN 100 MILLIGRAM(S): 400 CAPSULE ORAL at 13:20

## 2019-08-16 RX ADMIN — Medication 6 UNIT(S): at 17:17

## 2019-08-16 RX ADMIN — CHLORHEXIDINE GLUCONATE 1 APPLICATION(S): 213 SOLUTION TOPICAL at 05:37

## 2019-08-16 NOTE — DISCHARGE NOTE PROVIDER - HOSPITAL COURSE
1/F from home, lives with aide, hx of dementia HTN, DM, HLD presented to ED with generalized weakness and shaking. Pt was admitted in 6/2019 for same symptoms. As per daughter pt acutely developed weakness and shaking. Shaking is described as tremor with action, no seizure like activity. Pt states she has good appetite but is afraid to eat because she doesn't want to cause her FS to become elevated. At home she only walks once or twice per day from couch to bedroom. She otherwise lies on couch all day.    Inpatient, meclizine was held for possible effect on confusion. Shaking resolved, weakness is chronic and continued, likely due to debility. Patient's family desired for her to go home with physical therapy, refused SNF.     The patient is hemodynamically stable and ready for discharge.

## 2019-08-16 NOTE — DISCHARGE NOTE PROVIDER - NSDCFUADDAPPT_GEN_ALL_CORE_FT
Please make an appointment to see a primary care provider within a week:   [facility]  [address] [place] [address]  [phone]

## 2019-08-16 NOTE — DISCHARGE NOTE PROVIDER - NSDCHHHOMEBOUND_GEN_ALL_CORE
Chest pain/weakness during/after ambulation   greater than 20 feet/Requires supervison due to deteriorating mental status.../Fall risk

## 2019-08-16 NOTE — DISCHARGE NOTE PROVIDER - NSDCCPCAREPLAN_GEN_ALL_CORE_FT
PRINCIPAL DISCHARGE DIAGNOSIS  Diagnosis: Weakness  Assessment and Plan of Treatment: You came in with generalized weakness, probably due to lack of activity.  -Please get out of bed and sit in a chair every day  -Please participate in physical therapy as tolerated  -Please be sure to eat enough food each day, between 1600 to 1800 calories a day  -Please follow up with your primary care provider

## 2019-08-16 NOTE — CHART NOTE - NSCHARTNOTEFT_GEN_A_CORE
<<<RESIDENT DISCHARGE NOTE>>>     BARBARA GOODRICH  MRN-[id number]    VITAL SIGNS:  T(F): 96.7 (08-16-19 @ 05:25), Max: 97.9 (08-15-19 @ 22:40)  HR: 75 (08-16-19 @ 05:25)  BP: 159/74 (08-16-19 @ 05:25)  SpO2: --      T(C): 35.9 (08-16-19 @ 05:25), Max: 36.6 (08-15-19 @ 22:40)  HR: 75 (08-16-19 @ 05:25) (75 - 87)  BP: 159/74 (08-16-19 @ 05:25) (159/74 - 176/84)  RR: 18 (08-16-19 @ 05:25) (18 - 18)  SpO2: --    GENERAL: NAD, well-developed, 78y lying in bed  EENT: EOMI, conjunctiva and sclera clear, No nasal obstruction or discharge  RESPIRATORY: Anterior fields clear to auscultation bilaterally; No wheeze or crackles, patient refuses to turn/sit up  CARDIOVASCULAR: Regular rate and rhythm; No murmurs, rubs, or gallops, no LE edema  GASTROINTESTINAL: Abdomen Soft, Nontender, Nondistended; Bowel sounds present  MUSCULOSKELETAL:  No cyanosis, extremities grossly symmetrical  PSYCH: Alert, does not participate in orientation questions ("You don't know my name??")  NEUROLOGY: non-focal, confused, MAEE    TEST RESULTS:                        10.7   5.21  )-----------( 259      ( 15 Aug 2019 07:53 )             35.5       08-15    141  |  105  |  16  ----------------------------<  164<H>  4.2   |  28  |  0.9    Ca    9.9      15 Aug 2019 07:53        FINAL DISCHARGE INTERVIEW:  Resident(s) Present: (Name:_________Dimant____), RN Present: (Name:  ___Karie________)    DISCHARGE MEDICATION RECONCILIATION  reviewed with Attending (Name:____DiMaiuta_______)    DISPOSITION:   [  ] Home,    [X ] Home with Visiting Nursing Services,   [    ]  SNF/ NH,    [   ] Acute Rehab (4A),   [   ] Other (Specify:_________) <<<RESIDENT DISCHARGE NOTE>>>     BARBARA GOODRICH  MRN-[id number]    VITAL SIGNS:  T(F): 96.7 (08-16-19 @ 05:25), Max: 97.9 (08-15-19 @ 22:40)  HR: 75 (08-16-19 @ 05:25)  BP: 159/74 (08-16-19 @ 05:25)  SpO2: --      T(C): 35.9 (08-16-19 @ 05:25), Max: 36.6 (08-15-19 @ 22:40)  HR: 75 (08-16-19 @ 05:25) (75 - 87)  BP: 159/74 (08-16-19 @ 05:25) (159/74 - 176/84)  RR: 18 (08-16-19 @ 05:25) (18 - 18)  SpO2: --    GENERAL: NAD, well-developed, 78y lying in bed  EENT: EOMI, conjunctiva and sclera clear, No nasal obstruction or discharge  RESPIRATORY: Anterior fields clear to auscultation bilaterally; No wheeze or crackles, patient refuses to turn/sit up  CARDIOVASCULAR: Regular rate and rhythm; No murmurs, rubs, or gallops, no LE edema  GASTROINTESTINAL: Abdomen Soft, Nontender, Nondistended; Bowel sounds present  MUSCULOSKELETAL:  No cyanosis, extremities grossly symmetrical  PSYCH: Alert, does not participate in orientation questions ("You don't know my name??")  NEUROLOGY: non-focal, confused, MAEE    TEST RESULTS:                        10.7   5.21  )-----------( 259      ( 15 Aug 2019 07:53 )             35.5       08-15    141  |  105  |  16  ----------------------------<  164<H>  4.2   |  28  |  0.9    Ca    9.9      15 Aug 2019 07:53        FINAL DISCHARGE INTERVIEW:  Resident(s) Present: (Name:_________Gokulant____), RN Present: (Name:  ___Karie________)    DISCHARGE MEDICATION RECONCILIATION  reviewed with Attending (Name:____Eros_______)    Pt seen and examined independently. Medically stable for d/c home    DISPOSITION:   [  ] Home,    [X ] Home with Visiting Nursing Services,   [    ]  SNF/ NH,    [   ] Acute Rehab (4A),   [   ] Other (Specify:_________)

## 2019-08-19 LAB
CULTURE RESULTS: SIGNIFICANT CHANGE UP
SPECIMEN SOURCE: SIGNIFICANT CHANGE UP

## 2019-08-21 DIAGNOSIS — E11.9 TYPE 2 DIABETES MELLITUS WITHOUT COMPLICATIONS: ICD-10-CM

## 2019-08-21 DIAGNOSIS — F03.90 UNSPECIFIED DEMENTIA WITHOUT BEHAVIORAL DISTURBANCE: ICD-10-CM

## 2019-08-21 DIAGNOSIS — E87.1 HYPO-OSMOLALITY AND HYPONATREMIA: ICD-10-CM

## 2019-08-21 DIAGNOSIS — E78.5 HYPERLIPIDEMIA, UNSPECIFIED: ICD-10-CM

## 2019-08-21 DIAGNOSIS — Z20.2 CONTACT WITH AND (SUSPECTED) EXPOSURE TO INFECTIONS WITH A PREDOMINANTLY SEXUAL MODE OF TRANSMISSION: ICD-10-CM

## 2019-08-21 DIAGNOSIS — R53.1 WEAKNESS: ICD-10-CM

## 2019-08-21 DIAGNOSIS — Z79.84 LONG TERM (CURRENT) USE OF ORAL HYPOGLYCEMIC DRUGS: ICD-10-CM

## 2019-08-21 DIAGNOSIS — R25.1 TREMOR, UNSPECIFIED: ICD-10-CM

## 2019-08-21 DIAGNOSIS — Z79.82 LONG TERM (CURRENT) USE OF ASPIRIN: ICD-10-CM

## 2019-08-21 DIAGNOSIS — I10 ESSENTIAL (PRIMARY) HYPERTENSION: ICD-10-CM

## 2020-03-12 PROBLEM — Z00.00 ENCOUNTER FOR PREVENTIVE HEALTH EXAMINATION: Status: ACTIVE | Noted: 2020-03-12

## 2020-03-17 ENCOUNTER — APPOINTMENT (OUTPATIENT)
Dept: CARDIOLOGY | Facility: CLINIC | Age: 80
End: 2020-03-17

## 2020-12-09 NOTE — ED ADULT NURSE NOTE - CARDIO WDL
tolerates stair climbing without any exertional symptoms Normal rate, regular rhythm, normal S1, S2 heart sounds heard.

## 2021-03-04 NOTE — ED POST DISCHARGE NOTE - RESULT SUMMARY
[facility]    PATIENT'S NAME: Ry Tellez   ATTENDING PHYSICIAN: Alysia Sebastian M.D. OPERATING PHYSICIAN: Alysia Sebastian M.D.    PATIENT ACCOUNT#:   [de-identified]    LOCATION:  [address] [place] [address]  MEDICAL RECORD #:   [id number]       DATE OF BIRTH: + UCX - WILL RX CIPRO joint effusion was evacuated. The patella was everted. The knee was flexed. The medial and lateral meniscal remnants were removed. The ACL and PCL remnants were removed.   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   The intramedullary device was placed down the drill hole, and the outrigger was attached to the intramedullary device and rotated externally to match the natural ext was osteotomized. The diameter of the patella from superior to inferior was 38 mm. Drill holes were created for the patellar button. The height of the patellar button on the resected surface of the patella was 25 mm. Tracking of the patella was good. present on the lateral view and no clear evidence of a complicating process. The patient went to recovery room in stable condition. The intraoperative findings were discussed with patient's family and postoperative instructions were written.   The assista

## 2021-11-04 NOTE — ED ADULT TRIAGE NOTE - RESPIRATORY RATE (BREATHS/MIN)
What Type Of Note Output Would You Prefer (Optional)?: Standard Output How Severe Is Your Rash?: moderate Is This A New Presentation, Or A Follow-Up?: Rash 19

## 2024-08-13 ENCOUNTER — EMERGENCY (EMERGENCY)
Facility: HOSPITAL | Age: 84
LOS: 0 days | Discharge: HOME | End: 2024-08-13
Attending: EMERGENCY MEDICINE
Payer: MEDICAID

## 2024-08-13 VITALS
SYSTOLIC BLOOD PRESSURE: 154 MMHG | WEIGHT: 149.91 LBS | RESPIRATION RATE: 18 BRPM | HEART RATE: 85 BPM | TEMPERATURE: 98 F | OXYGEN SATURATION: 96 % | DIASTOLIC BLOOD PRESSURE: 78 MMHG

## 2024-08-13 DIAGNOSIS — K03.6 DEPOSITS [ACCRETIONS] ON TEETH: ICD-10-CM

## 2024-08-13 DIAGNOSIS — E11.9 TYPE 2 DIABETES MELLITUS WITHOUT COMPLICATIONS: ICD-10-CM

## 2024-08-13 DIAGNOSIS — I10 ESSENTIAL (PRIMARY) HYPERTENSION: ICD-10-CM

## 2024-08-13 DIAGNOSIS — E78.00 PURE HYPERCHOLESTEROLEMIA, UNSPECIFIED: ICD-10-CM

## 2024-08-13 DIAGNOSIS — K08.89 OTHER SPECIFIED DISORDERS OF TEETH AND SUPPORTING STRUCTURES: ICD-10-CM

## 2024-08-13 PROCEDURE — 99283 EMERGENCY DEPT VISIT LOW MDM: CPT

## 2024-08-13 RX ORDER — CHLORHEXIDINE GLUCONATE 500 MG/1
15 CLOTH TOPICAL
Qty: 1 | Refills: 0
Start: 2024-08-13 | End: 2024-08-19

## 2024-08-13 RX ORDER — AMOXICILLIN 500 MG
500 CAPSULE ORAL ONCE
Refills: 0 | Status: COMPLETED | OUTPATIENT
Start: 2024-08-13 | End: 2024-08-13

## 2024-08-13 RX ORDER — AMOXICILLIN 500 MG
1 CAPSULE ORAL
Qty: 30 | Refills: 0
Start: 2024-08-13 | End: 2024-08-22

## 2024-08-13 RX ORDER — ACETAMINOPHEN 500 MG
650 TABLET ORAL ONCE
Refills: 0 | Status: COMPLETED | OUTPATIENT
Start: 2024-08-13 | End: 2024-08-13

## 2024-08-13 RX ADMIN — Medication 650 MILLIGRAM(S): at 22:15

## 2024-08-13 RX ADMIN — Medication 500 MILLIGRAM(S): at 22:15

## 2024-08-13 NOTE — ED PROVIDER NOTE - PATIENT PORTAL LINK FT
You can access the FollowMyHealth Patient Portal offered by [facility] by registering at the following website: [email or web address]. By joining [facility]’s FollowMyHealth portal, you will also be able to view your health information using other applications (apps) compatible with our system.

## 2024-08-13 NOTE — ED PROVIDER NOTE - NSFOLLOWUPCLINICS_GEN_ALL_ED_FT
[facility]  Dental  [address] [place] [id number]  Phone: [phone]  Fax:

## 2024-08-13 NOTE — ED PROVIDER NOTE - OBJECTIVE STATEMENT
83 years old female history of hypertension, high cholesterol, diabetes presents complaint of right sided upper and lower tooth pain since this morning.  Took Tylenol earlier this morning which gave minimal relief.  Continue with pain this evening so daughter brought patient to ED for evaluation.  Otherwise denies fever, chills, chest pain, neck pain, difficulty breathing and swallowing.

## 2024-08-13 NOTE — ED PROVIDER NOTE - CLINICAL SUMMARY MEDICAL DECISION MAKING FREE TEXT BOX
82 yo woman w/ DM, HTN, HLD here w/ dental pain  R lower tooth pain  no fever  no swelling    exam reveals poor dentition w/ multiple missing teeth and mild ttp w/o swelling, fluctuance    Will refer to dental for continued care

## 2024-08-13 NOTE — ED PROVIDER NOTE - PHYSICAL EXAMINATION
CONSTITUTIONAL: Well-appearing;  elderly female;  in no apparent distress.   EYES: PERRL; EOM intact.   ENT: extremely poor oral hygiene with significant dental plaques build up too all teeth gum line. poor alignments with multiple missing teeth. mild ttp to tooth # 2 and 31. nml pharynx.   CARDIOVASCULAR: Normal S1, S2; no murmurs, rubs, or gallops.   RESPIRATORY: Normal chest excursion with respiration; breath sounds clear and equal bilaterally; no wheezes, rhonchi, or rales.  SKIN: No skin changes to right cheek  NEURO/PSYCH: A & O x 4; grossly unremarkable.

## 2024-08-29 ENCOUNTER — OUTPATIENT (OUTPATIENT)
Dept: OUTPATIENT SERVICES | Facility: HOSPITAL | Age: 84
LOS: 1 days | End: 2024-08-29
Payer: SELF-PAY

## 2024-08-29 DIAGNOSIS — K02.9 DENTAL CARIES, UNSPECIFIED: ICD-10-CM

## 2024-08-29 PROCEDURE — D0220: CPT

## 2024-08-29 PROCEDURE — D0140: CPT

## 2024-08-29 PROCEDURE — D9110: CPT

## 2024-09-03 DIAGNOSIS — K05.30 CHRONIC PERIODONTITIS, UNSPECIFIED: ICD-10-CM
